# Patient Record
Sex: MALE | Race: WHITE | NOT HISPANIC OR LATINO | Employment: UNEMPLOYED | URBAN - METROPOLITAN AREA
[De-identification: names, ages, dates, MRNs, and addresses within clinical notes are randomized per-mention and may not be internally consistent; named-entity substitution may affect disease eponyms.]

---

## 2021-06-19 ENCOUNTER — OFFICE VISIT (OUTPATIENT)
Dept: URGENT CARE | Age: 36
End: 2021-06-19

## 2021-06-19 VITALS
HEART RATE: 92 BPM | WEIGHT: 180 LBS | TEMPERATURE: 98.6 F | HEIGHT: 72 IN | SYSTOLIC BLOOD PRESSURE: 118 MMHG | RESPIRATION RATE: 18 BRPM | DIASTOLIC BLOOD PRESSURE: 70 MMHG | BODY MASS INDEX: 24.38 KG/M2

## 2021-06-19 DIAGNOSIS — A69.20 LYME DISEASE: Primary | ICD-10-CM

## 2021-06-19 DIAGNOSIS — L03.115 CELLULITIS OF RIGHT LOWER EXTREMITY: ICD-10-CM

## 2021-06-19 DIAGNOSIS — L02.91 ABSCESS: ICD-10-CM

## 2021-06-19 PROCEDURE — 87070 CULTURE OTHR SPECIMN AEROBIC: CPT | Performed by: NURSE PRACTITIONER

## 2021-06-19 PROCEDURE — 10060 I&D ABSCESS SIMPLE/SINGLE: CPT | Performed by: NURSE PRACTITIONER

## 2021-06-19 PROCEDURE — 87205 SMEAR GRAM STAIN: CPT | Performed by: NURSE PRACTITIONER

## 2021-06-19 PROCEDURE — 99203 OFFICE O/P NEW LOW 30 MIN: CPT | Performed by: NURSE PRACTITIONER

## 2021-06-19 RX ORDER — AMOXICILLIN AND CLAVULANATE POTASSIUM 500; 125 MG/1; MG/1
1 TABLET, FILM COATED ORAL EVERY 8 HOURS SCHEDULED
Qty: 42 TABLET | Refills: 0 | Status: SHIPPED | OUTPATIENT
Start: 2021-06-19 | End: 2021-07-03

## 2021-06-19 NOTE — PROGRESS NOTES
[facility]        NAME: Fermin Amin is a 31 y o  male  : 1985    MRN: [id number]  DATE: 2021  TIME: 2:20 PM    Assessment and Plan   Lyme disease [A69 20]  1  Lyme disease  amoxicillin-clavulanate (Augmentin) 500-125 mg per tablet   2  Cellulitis of right lower extremity  Wound culture and Gram stain         Patient Instructions   Augmentin every 8 hours for 14 days  Keep area clean and dry   Tylenol/motrin for pain or fever  Follow up with your PCP   If fever worsens, redness increases, or you are unable to take the antibiotics proceed to the ER     Follow up with PCP in 3-5 days  Proceed to  ER if symptoms worsen  Chief Complaint     Chief Complaint   Patient presents with    Tick Removal     Patient noticed area on his right hip area that looks like a tick bite- been feeling progressively worse for the last 2 days with vomiting, chills and muscle aches         History of Present Illness       Patient is a 31year old male presenting with rash to the right hip for the past 4 days  It stated at a pimple and progressed to a large red, swollen, warm area  He is concerned about tick borne illness as [place] a [place]  However, denies having any known tick bites  Denies fever but has had chills and sweats  He had 2 episodes of vomiting yesterday  He is taking Advil and tolerating PO water intake  Review of Systems   Review of Systems   Constitutional: Positive for activity change, chills and diaphoresis  Negative for fever  Gastrointestinal: Positive for vomiting  Negative for abdominal pain  Skin: Positive for color change and rash  Neurological: Negative for headaches           Current Medications       Current Outpatient Medications:     amoxicillin-clavulanate (Augmentin) 500-125 mg per tablet, Take 1 tablet by mouth every 8 (eight) hours for 14 days, Disp: 42 tablet, Rfl: 0    Current Allergies     Allergies as of 2021    (No Known Allergies) The following portions of the patient's history were reviewed and updated as appropriate: allergies, current medications, past family history, past medical history, past social history, past surgical history and problem list      No past medical history on file  No past surgical history on file  No family history on file  Medications have been verified  Objective   /70 (BP Location: Right arm, Patient Position: Sitting, Cuff Size: Large)   Pulse 92   Temp 98 6 °F (37 °C) (Skin)   Resp 18   Ht 6' (1 829 m)   Wt 81 6 kg (180 lb)   BMI 24 41 kg/m²          Physical Exam     Physical Exam  Vitals reviewed  Constitutional:       General: He is awake  He is not in acute distress  Appearance: Normal appearance  He is normal weight  Cardiovascular:      Rate and Rhythm: Normal rate and regular rhythm  Heart sounds: Normal heart sounds, S1 normal and S2 normal    Pulmonary:      Effort: Pulmonary effort is normal       Breath sounds: Normal breath sounds  No decreased breath sounds, wheezing, rhonchi or rales  Skin:     General: Skin is warm and moist           Neurological:      General: No focal deficit present  Mental Status: He is alert, oriented to person, place, and time and easily aroused  Psychiatric:         Behavior: Behavior is cooperative  Incision and drain    Date/Time: 6/19/2021 8:30 AM  Performed by: ALVINA Cagle  Authorized by: ALVINA Cagle   Universal Protocol:  Procedure performed by:  Consent given by: patient  Patient understanding: patient states understanding of the procedure being performed  Patient identity confirmed: verbally with patient      Patient location:  Clinic  Location:     Type:  Abscess    Size:  10cm x 10 cm     Location:  Lower extremity    Lower extremity location:  R hip  Pre-procedure details:     Skin preparation:  Betadine  Anesthesia (see MAR for exact dosages):      Anesthesia method:  Local infiltration    Local anesthetic:  Lidocaine 1% WITH epi  Procedure details:     Complexity:  Simple    Needle aspiration: no      Incision types:  Single straight    Scalpel blade:  11    Approach:  Open    Incision depth:  Subcutaneous    Wound management:  Probed and deloculated and irrigated with saline    Drainage:  Bloody    Drainage amount:  Scant    Wound treatment:  Wound left open  Post-procedure details:     Patient tolerance of procedure:   Tolerated well, no immediate complications

## 2021-06-19 NOTE — PATIENT INSTRUCTIONS
Augmentin every 8 hours for 14 days  Keep area clean and dry   Tylenol/motrin for pain or fever  Follow up with your PCP   If fever worsens, redness increases, or you are unable to take the antibiotics proceed to the ER     Lyme Disease   WHAT YOU NEED TO KNOW:   Lyme disease is a bacterial infection caused by the bite of an infected tick  DISCHARGE INSTRUCTIONS:   Call your local emergency number (911 in the [address] [facility]) if:   · Your heart is beating faster than usual and you feel dizzy  · You have chest pain or trouble breathing  · You suddenly cannot talk or see well, or you have trouble moving an area of your body  Return to the emergency department if:   · You have a headache and a stiff neck  · You have trouble concentrating or thinking clearly  · You have numbness or tingling in your arms or legs, or you have trouble walking  Call your doctor if:   · Your rash grows or spreads to other areas of your body  · You suddenly have trouble falling or staying asleep  · You have new or worsening pain and swelling in your joints  · You have new or worsening weakness and muscle pain  · You have a new tick bite  · You have questions or concerns about your condition or care  Medicines:   · Antibiotics  treat a bacterial infection  · NSAIDs , such as ibuprofen, help decrease swelling, pain, and fever  This medicine is available with or without a doctor's order  NSAIDs can cause stomach bleeding or kidney problems in certain people  If you take blood thinner medicine, always ask your healthcare provider if NSAIDs are safe for you  Always read the medicine label and follow directions  · Take your medicine as directed  Contact your healthcare provider if you think your medicine is not helping or if you have side effects  Tell him of her if you are allergic to any medicine  Keep a list of the medicines, vitamins, and herbs you take  Include the amounts, and when and why you take them   Bring the list or the pill bottles to follow-up visits  Carry your medicine list with you in case of an emergency  Prevent a tick bite:  Ticks live in areas covered by brush and grass  They may even be found in your lawn if you live in certain areas  Outdoor pets can carry ticks inside the house  Ticks can grab onto you or your clothes when you walk by grass or brush  If you go into areas that contain many trees, tall grasses, and underbrush, do the following:  · Wear light colored pants and a long-sleeved shirt  Tuck your pants into your socks or boots  Tuck in your shirt  Wear sleeves that fit close to the skin at your wrists and neck  This will help prevent ticks from crawling through gaps in your clothing and onto your skin  Wear a hat in areas with trees  · Apply insect repellant on your skin  The insect repellant should contain DEET  Do not put insect repellant on skin that is cut, scratched, or irritated  Always use soap and water to wash the insect repellant off as soon as possible once you are indoors  Do not apply insect repellant on your child's face or hands  · Spray insect repellant onto your clothes  Use permethrin spray  This spray kills ticks that crawl on your clothing  Be sure to spray the tops of your boots, bottom of pant legs, and sleeve cuffs  As soon as possible, wash and dry clothing in hot water and high heat  · Check your and your child's clothing, hair, and skin for ticks  Shower within 2 hours of coming indoors  Carefully check the hairline, armpits, neck, and waist      · Decrease the risk for ticks in your yard  Ticks like to live in shady, moist areas  Shayna Jacks your lawn regularly to keep the grass short  Trim the grass around birdbaths and fences  Cut branches that are overgrown and take them out of the yard  Clear out leaf piles  [facility] in a dry, kvng area  · Treat pets with tick control products  as directed  This will decrease your risk for a tick bite   Check your pets for ticks  Remove ticks from pets the same way as you remove them from people  Ask your pet's  about the best product to use on your pet  · Remove a tick with tweezers  Wear gloves  Grasp the tick as close to your skin as possible  Pull the tick straight up and out  Do not touch the tick with your bare hands  Check to make sure you removed the whole tick, including the head  Clean the area with soap and water or rubbing alcohol  Then wash your hands with soap and water  Follow up with your doctor as directed:  Write down your questions so you remember to ask them during your visits  © Copyright [address] Information is for End User's use only and may not be sold, redistributed or otherwise used for commercial purposes  All illustrations and images included in CareNotes® are the copyrighted property of A D A M , Inc  or [facility] Issac Seo   The above information is an  only  It is not intended as medical advice for individual conditions or treatments  Talk to your doctor, nurse or pharmacist before following any medical regimen to see if it is safe and effective for you  Cellulitis   WHAT YOU NEED TO KNOW:   Cellulitis is a skin infection caused by bacteria  Cellulitis may go away on its own or you may need treatment  Your healthcare provider may draw a Knik around the outside edges of your cellulitis  If your cellulitis spreads, your healthcare provider will see it outside of the Knik  DISCHARGE INSTRUCTIONS:   Call 911 if:   · You have sudden trouble breathing or chest pain  Return to the emergency department if:   · Your wound gets larger and more painful  · You feel a crackling under your skin when you touch it  · You have purple dots or bumps on your skin, or you see bleeding under your skin  · You have new swelling and pain in your legs  · The red, warm, swollen area gets larger      · You see red streaks coming from the infected area     Contact your healthcare provider if:   · You have a fever  · Your fever or pain does not go away or gets worse  · The area does not get smaller after 2 days of antibiotics  · Your skin is flaking or peeling off  · You have questions or concerns about your condition or care  Medicines:   · Antibiotics  help treat the bacterial infection  · NSAIDs , such as ibuprofen, help decrease swelling, pain, and fever  NSAIDs can cause stomach bleeding or kidney problems in certain people  If you take blood thinner medicine, always ask if NSAIDs are safe for you  Always read the medicine label and follow directions  Do not give these medicines to children under 10months of age without direction from your child's healthcare provider  · Acetaminophen  decreases pain and fever  It is available without a doctor's order  Ask how much to take and how often to take it  Follow directions  Read the labels of all other medicines you are using to see if they also contain acetaminophen, or ask your doctor or pharmacist  Acetaminophen can cause liver damage if not taken correctly  Do not use more than 4 grams (4,000 milligrams) total of acetaminophen in one day  · Take your medicine as directed  Contact your healthcare provider if you think your medicine is not helping or if you have side effects  Tell him or her if you are allergic to any medicine  Keep a list of the medicines, vitamins, and herbs you take  Include the amounts, and when and why you take them  Bring the list or the pill bottles to follow-up visits  Carry your medicine list with you in case of an emergency  Self-care:   · Elevate the area above the level of your heart  as often as you can  This will help decrease swelling and pain  Prop the area on pillows or blankets to keep it elevated comfortably  · Clean the area daily until the wound scabs over  Gently wash the area with soap and water  Pat dry  Use dressings as directed  · Place cool or warm, wet cloths on the area as directed  Use clean cloths and clean water  Leave it on the area until the cloth is room temperature  Pat the area dry with a clean, dry cloth  The cloths may help decrease pain  Prevent cellulitis:   · Do not scratch bug bites or areas of injury  You increase your risk for cellulitis by scratching these areas  · Do not share personal items, such as towels, clothing, and razors  · Clean exercise equipment  with germ-killing  before and after you use it  · Wash your hands often  Use soap and water  Wash your hands after you use the bathroom, change a child's diapers, or sneeze  Wash your hands before you prepare or eat food  Use lotion to prevent dry, cracked skin  · Wear pressure stockings as directed  You may be told to wear the stockings if you have peripheral edema  The stockings improve blood flow and decrease swelling  · Treat athlete's foot  This can help prevent the spread of a bacterial skin infection  Follow up with your healthcare provider within 3 days, or as directed: Your healthcare provider will check if your cellulitis is getting better  You may need different medicine  Write down your questions so you remember to ask them during your visits  © Copyright [address] [place] [address] Drive Information is for End User's use only and may not be sold, redistributed or otherwise used for commercial purposes  All illustrations and images included in CareNotes® are the copyrighted property of A D A M , Inc  or Sarath Lewis  The above information is an  only  It is not intended as medical advice for individual conditions or treatments  Talk to your doctor, nurse or pharmacist before following any medical regimen to see if it is safe and effective for you

## 2021-06-23 ENCOUNTER — OFFICE VISIT (OUTPATIENT)
Dept: FAMILY MEDICINE CLINIC | Facility: CLINIC | Age: 36
End: 2021-06-23

## 2021-06-23 VITALS
HEIGHT: 72 IN | WEIGHT: 184.2 LBS | OXYGEN SATURATION: 98 % | DIASTOLIC BLOOD PRESSURE: 78 MMHG | RESPIRATION RATE: 16 BRPM | HEART RATE: 67 BPM | BODY MASS INDEX: 24.95 KG/M2 | SYSTOLIC BLOOD PRESSURE: 142 MMHG | TEMPERATURE: 97.3 F

## 2021-06-23 DIAGNOSIS — L08.9 SKIN INFECTION: Primary | ICD-10-CM

## 2021-06-23 LAB
BACTERIA WND AEROBE CULT: NORMAL
GRAM STN SPEC: NORMAL

## 2021-06-23 PROCEDURE — 99213 OFFICE O/P EST LOW 20 MIN: CPT | Performed by: NURSE PRACTITIONER

## 2021-06-29 NOTE — PROGRESS NOTES
BMI Counseling: Body mass index is 24 98 kg/m²  The BMI is above normal  Nutrition recommendations include decreasing portion sizes, encouraging healthy choices of fruits and vegetables, decreasing fast food intake, consuming healthier snacks, limiting drinks that contain sugar, moderation in carbohydrate intake, increasing intake of lean protein and reducing intake of cholesterol  Exercise recommendations include exercising 3-5 times per week and strength training exercises  Normal BMI       Depression Screening and Follow-up Plan: Patient's depression screening was positive with a PHQ-2 score of 0  Clincally patient does not have depression  No treatment is required  Tobacco Cessation Counseling: Tobacco cessation counseling was provided  The patient is sincerely urged to quit consumption of tobacco  He is not ready to quit tobacco  Medication options and side effects of medication discussed  Patient agreed to medication  Assessment/Plan:  Presents in office post urgent care follow up for skin infection to the right hip that required to be drained  Currently on antibiotics- healing well no redness or swelling - still slight yellowish drainage  Area cleaned with water - dressing changed  '  Continue antibiotics and discussed follow up if needed   Reportable s/s    I have requested some labs to be done when done with antibiotics  Monitor for fevers - if any fevers or worsening of symptoms despite antibiotic use he is to call us      Problem List Items Addressed This Visit     None      Visit Diagnoses     Skin infection    -  Primary    Relevant Orders    CBC and differential    Comprehensive metabolic panel            Subjective:      Patient ID: Isabel Maya is a 31 y o  male  resents in office post urgent care follow up for skin infection to the right hip that required to be drained  Currently on antibiotics- healing well no redness or swelling - still slight yellowish drainage   Area cleaned with water - dressing changed  '  Continue antibiotics and discussed follow up if needed         The following portions of the patient's history were reviewed and updated as appropriate:   Past Medical History:  He has no past medical history on file ,  _______________________________________________________________________  Medical Problems:  does not have a problem list on file ,  _______________________________________________________________________  Past Surgical History:   has no past surgical history on file ,  _______________________________________________________________________  Family History:  family history is not on file ,  _______________________________________________________________________  Social History:   reports that he has been smoking  He has never used smokeless tobacco  He reports current alcohol use  He reports that he does not use drugs  ,  _______________________________________________________________________  Allergies:  has No Known Allergies     _______________________________________________________________________  Current Outpatient Medications   Medication Sig Dispense Refill    amoxicillin-clavulanate (Augmentin) 500-125 mg per tablet Take 1 tablet by mouth every 8 (eight) hours for 14 days 42 tablet 0     No current facility-administered medications for this visit      _______________________________________________________________________  Review of Systems   Constitutional: Negative for chills and fatigue  HENT: Negative for congestion and sore throat  Eyes: Negative  Respiratory: Negative for cough  Cardiovascular: Negative for chest pain and palpitations  Gastrointestinal: Negative for abdominal pain  Endocrine: Negative  Genitourinary: Negative for flank pain  Skin:        Right hip skin infection    Hematological: Negative for adenopathy  Psychiatric/Behavioral: Negative for suicidal ideas  The patient is not nervous/anxious            Objective:  Vitals: 06/23/21 1447   BP: 142/78   BP Location: Left arm   Patient Position: Sitting   Cuff Size: Standard   Pulse: 67   Resp: 16   Temp: (!) 97 3 °F (36 3 °C)   TempSrc: Temporal   SpO2: 98%   Weight: 83 6 kg (184 lb 3 2 oz)   Height: 6' (1 829 m)     Body mass index is 24 98 kg/m²  Physical Exam  Vitals and nursing note reviewed  Constitutional:       Appearance: Normal appearance  Comments: BMI 24 98    HENT:      Head: Normocephalic  Nose: Nose normal       Mouth/Throat:      Mouth: Mucous membranes are dry  Cardiovascular:      Rate and Rhythm: Normal rate and regular rhythm  Pulses: Normal pulses  Heart sounds: Normal heart sounds  Pulmonary:      Effort: Pulmonary effort is normal       Breath sounds: Normal breath sounds  Abdominal:      Palpations: Abdomen is soft  Musculoskeletal:         General: Normal range of motion  Cervical back: Normal range of motion  Skin:     Findings: Erythema present  Comments: Right hip skin infection  Round 1cm in diameter  With open mid area - small drainage of yellowish clear drainage    Neurological:      Mental Status: He is alert and oriented to person, place, and time     Psychiatric:         Mood and Affect: Mood normal          Behavior: Behavior normal

## 2021-06-29 NOTE — PATIENT INSTRUCTIONS
Folliculitis   WHAT YOU NEED TO KNOW:   Folliculitis is inflammation of your hair follicles  A hair follicle is a sac under your skin  Your hair grows out of the follicle  Folliculitis is caused by bacteria or fungus, most commonly a germ called Staph  Folliculitis can occur anywhere you have hair  DISCHARGE INSTRUCTIONS:   Medicines:   · Antibiotics: This medicine is given to fight or prevent an infection caused by bacteria  It may be given as an ointment that you apply to your skin or as a pill  Always take your antibiotics exactly as ordered by your healthcare provider  Never save antibiotics or take leftover antibiotics that were given to you for another illness  · Antifungal medicine: This medicine helps kill fungus that may be causing your folliculitis  It may be given as an cream that you apply to your skin or as a pill  · NSAIDs , such as ibuprofen, help decrease swelling, pain, and fever  This medicine is available with or without a doctor's order  NSAIDs can cause stomach bleeding or kidney problems in certain people  If you take blood thinner medicine, always ask if NSAIDs are safe for you  Always read the medicine label and follow directions  Do not give these medicines to children under 10months of age without direction from your child's healthcare provider  · Antihistamines: This medicine may be given to help decrease itching  · Take your medicine as directed  Contact your healthcare provider if you think your medicine is not helping or if you have side effects  Tell him of her if you are allergic to any medicine  Keep a list of the medicines, vitamins, and herbs you take  Include the amounts, and when and why you take them  Bring the list or the pill bottles to follow-up visits  Carry your medicine list with you in case of an emergency      Follow up with your healthcare provider or dermatologist as directed:  Write down your questions so you remember to ask them during your visits  Manage folliculitis:   · Use warm compresses:  Wet a washcloth with warm water and apply it to the infected skin area to help decrease pain and swelling  Warm compresses may also help drain pus and improve healing  · Clean the area:  Use antibacterial soap to wash the affected area  Change your washcloths and towels every day  · Avoid shaving the area: If possible, do not shave areas that have folliculitis  If you must shave, use an electric razor or new blade every time you shave  Prevent folliculitis:   · Do not share personal items:  Do not share towels, soap, or any personal items with other people  · Do not wear tight clothing:  Do not wear tight-fitting clothes that rub against and irritate your skin  · Treat skin injuries right away:  Treat injuries such as cuts and scrapes right away  Wash them with warm, soapy water, and cover the area to prevent infection  Contact your healthcare provider or dermatologist if:  · You have a fever  · You have foul-smelling pus coming from the bumps on your skin  · Your rash is spreading  · You have questions or concerns about your condition or care  Return to the emergency department if:  · You develop large areas of red, warm, tender skin around the folliculitis  · You develop boils  © Copyright [address] Information is for End User's use only and may not be sold, redistributed or otherwise used for commercial purposes  All illustrations and images included in CareNotes® are the copyrighted property of A D A M , Inc  or [facility] [employer]   The above information is an  only  It is not intended as medical advice for individual conditions or treatments  Talk to your doctor, nurse or pharmacist before following any medical regimen to see if it is safe and effective for you  Cellulitis   WHAT YOU NEED TO KNOW:   Cellulitis is a skin infection caused by bacteria   Cellulitis may go away on its own or you may need treatment  Your healthcare provider may draw a [place] around the outside edges of your cellulitis  If your cellulitis spreads, your healthcare provider will see it outside of the [place]  DISCHARGE INSTRUCTIONS:   Call 911 if:   · You have sudden trouble breathing or chest pain  Return to the emergency department if:   · Your wound gets larger and more painful  · You feel a crackling under your skin when you touch it  · You have purple dots or bumps on your skin, or you see bleeding under your skin  · You have new swelling and pain in your legs  · The red, warm, swollen area gets larger  · You see red streaks coming from the infected area  Contact your healthcare provider if:   · You have a fever  · Your fever or pain does not go away or gets worse  · The area does not get smaller after 2 days of antibiotics  · Your skin is flaking or peeling off  · You have questions or concerns about your condition or care  Medicines:   · Antibiotics  help treat the bacterial infection  · NSAIDs , such as ibuprofen, help decrease swelling, pain, and fever  NSAIDs can cause stomach bleeding or kidney problems in certain people  If you take blood thinner medicine, always ask if NSAIDs are safe for you  Always read the medicine label and follow directions  Do not give these medicines to children under 10months of age without direction from your child's healthcare provider  · Acetaminophen  decreases pain and fever  It is available without a doctor's order  Ask how much to take and how often to take it  Follow directions  Read the labels of all other medicines you are using to see if they also contain acetaminophen, or ask your doctor or pharmacist  Acetaminophen can cause liver damage if not taken correctly  Do not use more than 4 grams (4,000 milligrams) total of acetaminophen in one day  · Take your medicine as directed    Contact your healthcare provider if you think your medicine is not helping or if you have side effects  Tell him or her if you are allergic to any medicine  Keep a list of the medicines, vitamins, and herbs you take  Include the amounts, and when and why you take them  Bring the list or the pill bottles to follow-up visits  Carry your medicine list with you in case of an emergency  Self-care:   · Elevate the area above the level of your heart  as often as you can  This will help decrease swelling and pain  Prop the area on pillows or blankets to keep it elevated comfortably  · Clean the area daily until the wound scabs over  Gently wash the area with soap and water  Pat dry  Use dressings as directed  · Place cool or warm, wet cloths on the area as directed  Use clean cloths and clean water  Leave it on the area until the cloth is room temperature  Pat the area dry with a clean, dry cloth  The cloths may help decrease pain  Prevent cellulitis:   · Do not scratch bug bites or areas of injury  You increase your risk for cellulitis by scratching these areas  · Do not share personal items, such as towels, clothing, and razors  · Clean exercise equipment  with germ-killing  before and after you use it  · Wash your hands often  Use soap and water  Wash your hands after you use the bathroom, change a child's diapers, or sneeze  Wash your hands before you prepare or eat food  Use lotion to prevent dry, cracked skin  · Wear pressure stockings as directed  You may be told to wear the stockings if you have peripheral edema  The stockings improve blood flow and decrease swelling  · Treat athlete's foot  This can help prevent the spread of a bacterial skin infection  Follow up with your healthcare provider within 3 days, or as directed: Your healthcare provider will check if your cellulitis is getting better  You may need different medicine  Write down your questions so you remember to ask them during your visits    © Copyright [address] Information is for [employer] use only and may not be sold, redistributed or otherwise used for commercial purposes  All illustrations and images included in CareNotes® are the copyrighted property of A D A M , Inc  or Sarath Lewis  The above information is an  only  It is not intended as medical advice for individual conditions or treatments  Talk to your doctor, nurse or pharmacist before following any medical regimen to see if it is safe and effective for you

## 2023-03-24 ENCOUNTER — APPOINTMENT (EMERGENCY)
Dept: RADIOLOGY | Facility: HOSPITAL | Age: 38
End: 2023-03-24

## 2023-03-24 ENCOUNTER — HOSPITAL ENCOUNTER (INPATIENT)
Facility: HOSPITAL | Age: 38
LOS: 6 days | Discharge: HOME | End: 2023-03-31
Attending: EMERGENCY MEDICINE | Admitting: INTERNAL MEDICINE

## 2023-03-24 DIAGNOSIS — M71.121 SEPTIC BURSITIS OF ELBOW, RIGHT: Primary | ICD-10-CM

## 2023-03-24 DIAGNOSIS — D72.829 LEUKOCYTOSIS: ICD-10-CM

## 2023-03-24 DIAGNOSIS — M70.31 BURSITIS OF RIGHT ELBOW, UNSPECIFIED BURSA: ICD-10-CM

## 2023-03-24 RX ORDER — ONDANSETRON 2 MG/ML
4 INJECTION INTRAMUSCULAR; INTRAVENOUS ONCE
Status: COMPLETED | OUTPATIENT
Start: 2023-03-25 | End: 2023-03-25

## 2023-03-24 RX ORDER — ACETAMINOPHEN 325 MG/1
975 TABLET ORAL ONCE
Status: COMPLETED | OUTPATIENT
Start: 2023-03-24 | End: 2023-03-24

## 2023-03-24 RX ORDER — LIDOCAINE HYDROCHLORIDE 10 MG/ML
5 INJECTION, SOLUTION EPIDURAL; INFILTRATION; INTRACAUDAL; PERINEURAL ONCE
Status: COMPLETED | OUTPATIENT
Start: 2023-03-24 | End: 2023-03-24

## 2023-03-24 RX ADMIN — ACETAMINOPHEN 975 MG: 325 TABLET ORAL at 23:22

## 2023-03-24 RX ADMIN — LIDOCAINE HYDROCHLORIDE 5 ML: 10 INJECTION, SOLUTION EPIDURAL; INFILTRATION; INTRACAUDAL; PERINEURAL at 23:21

## 2023-03-24 NOTE — LETTER
[facility]G UN[facility]  [address] [facility] [address]  Dept: [phone]    March 31, 2023     Patient: Chelle Olivia   YOB: 1985   Date of Visit: 3/24/2023       To Whom it May Concern:    Parris Seip is under my professional care  He was seen in the hospital from 3/24/2023 to 03/31/23  He He has to follow up with his Primary care physician who will clear him to return to work  If you have any questions or concerns, please don't hesitate to call           Sincerely,          Cassidy Foster MD

## 2023-03-25 PROBLEM — A41.9 SEPSIS (HCC): Status: ACTIVE | Noted: 2023-03-25

## 2023-03-25 PROBLEM — M71.9 BURSITIS: Status: ACTIVE | Noted: 2023-03-25

## 2023-03-25 PROBLEM — F17.200 NICOTINE DEPENDENCE: Status: ACTIVE | Noted: 2023-03-25

## 2023-03-25 LAB
ANION GAP SERPL CALCULATED.3IONS-SCNC: 11 MMOL/L (ref 4–13)
ANION GAP SERPL CALCULATED.3IONS-SCNC: 8 MMOL/L (ref 4–13)
ANISOCYTOSIS BLD QL SMEAR: PRESENT
APPEARANCE FLD: ABNORMAL
BASOPHILS # BLD AUTO: 0.11 THOUSANDS/ÂΜL (ref 0–0.1)
BASOPHILS # BLD MANUAL: 0 THOUSAND/UL (ref 0–0.1)
BASOPHILS NFR BLD AUTO: 1 % (ref 0–1)
BASOPHILS NFR MAR MANUAL: 0 % (ref 0–1)
BASOPHILS NFR SNV MANUAL: 1 %
BUN SERPL-MCNC: 11 MG/DL (ref 5–25)
BUN SERPL-MCNC: 14 MG/DL (ref 5–25)
CALCIUM SERPL-MCNC: 10.1 MG/DL (ref 8.4–10.2)
CALCIUM SERPL-MCNC: 9.1 MG/DL (ref 8.4–10.2)
CHLORIDE SERPL-SCNC: 106 MMOL/L (ref 96–108)
CHLORIDE SERPL-SCNC: 106 MMOL/L (ref 96–108)
CO2 SERPL-SCNC: 23 MMOL/L (ref 21–32)
CO2 SERPL-SCNC: 23 MMOL/L (ref 21–32)
COLOR FLD: ABNORMAL
CREAT SERPL-MCNC: 0.86 MG/DL (ref 0.6–1.3)
CREAT SERPL-MCNC: 0.88 MG/DL (ref 0.6–1.3)
CRP SERPL HS-MCNC: 3.72 MG/L
CRYSTALS SNV QL MICRO: NORMAL
EOSINOPHIL # BLD AUTO: 0.01 THOUSAND/ÂΜL (ref 0–0.61)
EOSINOPHIL # BLD MANUAL: 0.31 THOUSAND/UL (ref 0–0.4)
EOSINOPHIL NFR BLD AUTO: 0 % (ref 0–6)
EOSINOPHIL NFR BLD MANUAL: 2 % (ref 0–6)
EOSINOPHIL NFR SNV MANUAL: 2 %
ERYTHROCYTE [DISTWIDTH] IN BLOOD BY AUTOMATED COUNT: 12.8 % (ref 11.6–15.1)
ERYTHROCYTE [DISTWIDTH] IN BLOOD BY AUTOMATED COUNT: 12.9 % (ref 11.6–15.1)
ERYTHROCYTE [SEDIMENTATION RATE] IN BLOOD: 24 MM/HOUR (ref 0–14)
GFR SERPL CREATININE-BSD FRML MDRD: 110 ML/MIN/1.73SQ M
GFR SERPL CREATININE-BSD FRML MDRD: 111 ML/MIN/1.73SQ M
GLUCOSE SERPL-MCNC: 105 MG/DL (ref 65–140)
GLUCOSE SERPL-MCNC: 108 MG/DL (ref 65–140)
HCT VFR BLD AUTO: 45.2 % (ref 36.5–49.3)
HCT VFR BLD AUTO: 47.6 % (ref 36.5–49.3)
HGB BLD-MCNC: 15.4 G/DL (ref 12–17)
HGB BLD-MCNC: 16.2 G/DL (ref 12–17)
IMM GRANULOCYTES # BLD AUTO: 0.39 THOUSAND/UL (ref 0–0.2)
IMM GRANULOCYTES NFR BLD AUTO: 2 % (ref 0–2)
LACTATE SERPL-SCNC: 1.7 MMOL/L (ref 0.5–2)
LYMPHOCYTES # BLD AUTO: 0.49 THOUSANDS/ÂΜL (ref 0.6–4.47)
LYMPHOCYTES # BLD AUTO: 0.79 THOUSAND/UL (ref 0.6–4.47)
LYMPHOCYTES # BLD AUTO: 5 % (ref 14–44)
LYMPHOCYTES NFR BLD AUTO: 2 % (ref 14–44)
MCH RBC QN AUTO: 29.6 PG (ref 26.8–34.3)
MCH RBC QN AUTO: 30.1 PG (ref 26.8–34.3)
MCHC RBC AUTO-ENTMCNC: 34 G/DL (ref 31.4–37.4)
MCHC RBC AUTO-ENTMCNC: 34.1 G/DL (ref 31.4–37.4)
MCV RBC AUTO: 87 FL (ref 82–98)
MCV RBC AUTO: 88 FL (ref 82–98)
MONOCYTES # BLD AUTO: 0.63 THOUSAND/UL (ref 0–1.22)
MONOCYTES # BLD AUTO: 1.3 THOUSAND/ÂΜL (ref 0.17–1.22)
MONOCYTES NFR BLD AUTO: 6 % (ref 4–12)
MONOCYTES NFR BLD: 4 % (ref 4–12)
NEUTROPHILS # BLD AUTO: 20.02 THOUSANDS/ÂΜL (ref 1.85–7.62)
NEUTROPHILS # BLD MANUAL: 13.99 THOUSAND/UL (ref 1.85–7.62)
NEUTROPHILS NFR SNV MANUAL: 97 %
NEUTS BAND NFR BLD MANUAL: 1 % (ref 0–8)
NEUTS SEG NFR BLD AUTO: 88 % (ref 43–75)
NEUTS SEG NFR BLD AUTO: 89 % (ref 43–75)
NRBC BLD AUTO-RTO: 0 /100 WBCS
PLATELET # BLD AUTO: 274 THOUSANDS/UL (ref 149–390)
PLATELET # BLD AUTO: 327 THOUSANDS/UL (ref 149–390)
PLATELET BLD QL SMEAR: ADEQUATE
PMV BLD AUTO: 9.1 FL (ref 8.9–12.7)
PMV BLD AUTO: 9.9 FL (ref 8.9–12.7)
POIKILOCYTOSIS BLD QL SMEAR: PRESENT
POTASSIUM SERPL-SCNC: 3.8 MMOL/L (ref 3.5–5.3)
POTASSIUM SERPL-SCNC: 4.2 MMOL/L (ref 3.5–5.3)
RBC # BLD AUTO: 5.12 MILLION/UL (ref 3.88–5.62)
RBC # BLD AUTO: 5.47 MILLION/UL (ref 3.88–5.62)
SITE: ABNORMAL
SODIUM SERPL-SCNC: 137 MMOL/L (ref 135–147)
SODIUM SERPL-SCNC: 140 MMOL/L (ref 135–147)
TOTAL CELLS COUNTED SPEC: 100
WBC # BLD AUTO: 15.72 THOUSAND/UL (ref 4.31–10.16)
WBC # BLD AUTO: 22.32 THOUSAND/UL (ref 4.31–10.16)
WBC # FLD MANUAL: ABNORMAL /UL (ref 0–200)

## 2023-03-25 RX ORDER — KETOROLAC TROMETHAMINE 30 MG/ML
15 INJECTION, SOLUTION INTRAMUSCULAR; INTRAVENOUS EVERY 6 HOURS PRN
Status: DISPENSED | OUTPATIENT
Start: 2023-03-25 | End: 2023-03-27

## 2023-03-25 RX ORDER — NICOTINE 21 MG/24HR
1 PATCH, TRANSDERMAL 24 HOURS TRANSDERMAL DAILY
Status: DISCONTINUED | OUTPATIENT
Start: 2023-03-25 | End: 2023-03-31 | Stop reason: HOSPADM

## 2023-03-25 RX ORDER — KETOROLAC TROMETHAMINE 30 MG/ML
15 INJECTION, SOLUTION INTRAMUSCULAR; INTRAVENOUS ONCE
Status: COMPLETED | OUTPATIENT
Start: 2023-03-25 | End: 2023-03-25

## 2023-03-25 RX ORDER — CEFAZOLIN SODIUM 2 G/50ML
2000 SOLUTION INTRAVENOUS EVERY 8 HOURS SCHEDULED
Status: DISCONTINUED | OUTPATIENT
Start: 2023-03-25 | End: 2023-03-26

## 2023-03-25 RX ORDER — ONDANSETRON 2 MG/ML
4 INJECTION INTRAMUSCULAR; INTRAVENOUS EVERY 6 HOURS PRN
Status: DISCONTINUED | OUTPATIENT
Start: 2023-03-25 | End: 2023-03-31 | Stop reason: HOSPADM

## 2023-03-25 RX ORDER — ACETAMINOPHEN 325 MG/1
650 TABLET ORAL EVERY 6 HOURS PRN
Status: DISCONTINUED | OUTPATIENT
Start: 2023-03-25 | End: 2023-03-28

## 2023-03-25 RX ORDER — SODIUM CHLORIDE, SODIUM LACTATE, POTASSIUM CHLORIDE, CALCIUM CHLORIDE 600; 310; 30; 20 MG/100ML; MG/100ML; MG/100ML; MG/100ML
100 INJECTION, SOLUTION INTRAVENOUS CONTINUOUS
Status: DISPENSED | OUTPATIENT
Start: 2023-03-25 | End: 2023-03-25

## 2023-03-25 RX ORDER — CEFAZOLIN SODIUM 1 G/50ML
1000 SOLUTION INTRAVENOUS EVERY 8 HOURS
Status: DISCONTINUED | OUTPATIENT
Start: 2023-03-26 | End: 2023-03-25

## 2023-03-25 RX ADMIN — KETOROLAC TROMETHAMINE 15 MG: 30 INJECTION, SOLUTION INTRAMUSCULAR at 21:19

## 2023-03-25 RX ADMIN — SODIUM CHLORIDE, SODIUM LACTATE, POTASSIUM CHLORIDE, AND CALCIUM CHLORIDE 100 ML/HR: .6; .31; .03; .02 INJECTION, SOLUTION INTRAVENOUS at 04:16

## 2023-03-25 RX ADMIN — SODIUM CHLORIDE, SODIUM LACTATE, POTASSIUM CHLORIDE, AND CALCIUM CHLORIDE 100 ML/HR: .6; .31; .03; .02 INJECTION, SOLUTION INTRAVENOUS at 08:27

## 2023-03-25 RX ADMIN — KETOROLAC TROMETHAMINE 15 MG: 30 INJECTION, SOLUTION INTRAMUSCULAR; INTRAVENOUS at 02:30

## 2023-03-25 RX ADMIN — SODIUM CHLORIDE, SODIUM LACTATE, POTASSIUM CHLORIDE, AND CALCIUM CHLORIDE 1000 ML: .6; .31; .03; .02 INJECTION, SOLUTION INTRAVENOUS at 00:27

## 2023-03-25 RX ADMIN — ACETAMINOPHEN 650 MG: 325 TABLET ORAL at 04:22

## 2023-03-25 RX ADMIN — CEFAZOLIN SODIUM 2000 MG: 2 SOLUTION INTRAVENOUS at 21:19

## 2023-03-25 RX ADMIN — VANCOMYCIN HYDROCHLORIDE 2000 MG: 1 INJECTION, POWDER, LYOPHILIZED, FOR SOLUTION INTRAVENOUS at 06:07

## 2023-03-25 RX ADMIN — ONDANSETRON 4 MG: 2 INJECTION INTRAMUSCULAR; INTRAVENOUS at 17:37

## 2023-03-25 RX ADMIN — CEFTRIAXONE SODIUM 2000 MG: 10 INJECTION, POWDER, FOR SOLUTION INTRAVENOUS at 02:33

## 2023-03-25 RX ADMIN — ACETAMINOPHEN 650 MG: 325 TABLET ORAL at 09:15

## 2023-03-25 RX ADMIN — ONDANSETRON 4 MG: 2 INJECTION INTRAMUSCULAR; INTRAVENOUS at 00:21

## 2023-03-25 RX ADMIN — ONDANSETRON 4 MG: 2 INJECTION INTRAMUSCULAR; INTRAVENOUS at 12:44

## 2023-03-25 RX ADMIN — CEFAZOLIN SODIUM 2000 MG: 2 SOLUTION INTRAVENOUS at 13:28

## 2023-03-25 NOTE — H&P
[facility]  Name: Candace Craig  MRN: [id number]  Unit/Bed#: S -50 I Date of Admission: 3/24/2023   Date of Service: 3/25/2023 I Hospital Day: 0      Assessment/Plan   * Bursitis  Assessment & Plan  · Presented with sudden right elbow erythema, pain, swelling, rigors  Meet sepsis  · Synovial fluid: Cloudy, WBC 21,000  Culture, Gram stain pending  · Sed rate: 24, CRP pending  · Antibiotics: Rocephin  Continue with ancef and also add MRSA while awaiting culture  Consult pharmacy  · Pain control  · Consider ortho consult for ongoing needle aspiration pending response to antibiotic     Sepsis ([facility] [address] )  Assessment & Plan  · POA with leukocytosis, tachycardia  Lactate normal   · Source: Bursitis right elbow  · Cultures  · Continue Rocephin x1  Continue with ancef, vanco     Nicotine dependence  Assessment & Plan  · NRT while hospitalized       VTE Pharmacologic Prophylaxis: VTE Score: 2 Low Risk (Score 0-2) - Encourage Ambulation  Code Status: Level 1 - Full Code   Discussion with family: Patient declined call to   Anticipated Length of Stay: Patient will be admitted on an inpatient basis with an anticipated length of stay of greater than 2 midnights secondary to IV abx  Total Time Spent on Date of Encounter in care of patient: 75 minutes This time was spent on one or more of the following: performing physical exam; counseling and coordination of care; obtaining or reviewing history; documenting in the medical record; reviewing/ordering tests, medications or procedures; communicating with other healthcare professionals and discussing with patient's family/caregivers  Chief Complaint: right elbow pain, swelling    History of Present Illness:  Damián Longo is a 41 y o  male with a PMH of nicotine dependence, alcohol consumption, marijuana use who presents with sudden onset of right elbow pain, swelling, erythema    He previously had a scab but without signs of surrounding cellulitis  Scab occurred while playing with friend's child  Patient meets sepsis  Suspect septic bursitis  Fluid samples collected in ED consistent with infection  He was given rocephin in ED  Will continue with Ancef and add vancomycin pending cultures  Patient is employed as a   Review of Systems:  Review of Systems   Constitutional: Positive for chills  Musculoskeletal: Positive for arthralgias and joint swelling  Skin: Positive for color change  All other systems reviewed and are negative  Past Medical and Surgical History:   History reviewed  No pertinent past medical history  History reviewed  No pertinent surgical history  Meds/Allergies:  Prior to Admission medications    Not on File     I have reviewed home medications with a medical source (PCP, Pharmacy, other)  Allergies: No Known Allergies    Social History:  Marital Status: Single   Occupation:   Patient Pre-hospital Living Situation: Home  Patient Pre-hospital Level of Mobility: walks  Patient Pre-hospital Diet Restrictions:   Substance Use History:   Social History     Substance and Sexual Activity   Alcohol Use Yes   • Alcohol/week: 6 0 standard drinks   • Types: 6 Cans of beer per week     Social History     Tobacco Use   Smoking Status Every Day   • Packs/day: 1 00   • Types: Cigarettes   Smokeless Tobacco Never     Social History     Substance and Sexual Activity   Drug Use Yes   • Types: Marijuana       Family History:  History reviewed  No pertinent family history      Physical Exam:     Vitals:   Blood Pressure: 128/72 (03/25/23 0343)  Pulse: 98 (03/25/23 0343)  Temperature: (!) 102 7 °F (39 3 °C) (RN notified) (03/25/23 0343)  Temp Source: Oral (03/25/23 0343)  Respirations: 20 (03/25/23 0245)  Height: 6' (182 9 cm) (03/25/23 0343)  Weight - Scale: 82 9 kg (182 lb 11 2 oz) (03/25/23 0343)  SpO2: 97 % (03/25/23 0343)    Physical Exam  Constitutional:       General: He is not in acute distress  Appearance: Normal appearance  He is not ill-appearing  HENT:      Head: Normocephalic and atraumatic  Right Ear: External ear normal       Left Ear: External ear normal       Nose: Nose normal       Mouth/Throat:      Mouth: Mucous membranes are moist       Pharynx: Oropharynx is clear  Eyes:      General: No scleral icterus  Extraocular Movements: Extraocular movements intact  Conjunctiva/sclera: Conjunctivae normal    Cardiovascular:      Rate and Rhythm: Regular rhythm  Tachycardia present  Pulses: Normal pulses  Heart sounds: Normal heart sounds  Pulmonary:      Effort: Pulmonary effort is normal       Breath sounds: Normal breath sounds  Abdominal:      General: Bowel sounds are normal       Palpations: Abdomen is soft  Musculoskeletal:         General: Swelling present  Normal range of motion  Right elbow: Tenderness present  Cervical back: Normal range of motion  No rigidity or tenderness  Skin:     General: Skin is warm and dry  Capillary Refill: Capillary refill takes less than 2 seconds  Findings: Erythema (R elbow) present  Neurological:      General: No focal deficit present  Mental Status: He is alert and oriented to person, place, and time     Psychiatric:         Mood and Affect: Mood normal          Behavior: Behavior normal           Additional Data:     Lab Results:  Results from last 7 days   Lab Units 03/25/23  0006   WBC Thousand/uL 15 72*   HEMOGLOBIN g/dL 16 2   HEMATOCRIT % 47 6   PLATELETS Thousands/uL 327   BANDS PCT % 1   LYMPHO PCT % 5*   MONO PCT % 4   EOS PCT % 2     Results from last 7 days   Lab Units 03/25/23  0006   SODIUM mmol/L 140   POTASSIUM mmol/L 4 2   CHLORIDE mmol/L 106   CO2 mmol/L 23   BUN mg/dL 14   CREATININE mg/dL 0 86   ANION GAP mmol/L 11   CALCIUM mg/dL 10 1   GLUCOSE RANDOM mg/dL 105                 Results from last 7 days   Lab Units 03/25/23  0006   LACTIC ACID mmol/L 1 7 Lines/Drains:  Invasive Devices     Peripheral Intravenous Line  Duration           Peripheral IV 03/25/23 Left Antecubital <1 day                    Imaging: Reviewed radiology reports from this admission including: xray  XR elbow 2 vw RIGHT   ED Interpretation by Deven Herrera DO (03/25 0158)   Soft tissue swelling  No acute bony abnormality visualized  EKG and Other Studies Reviewed on Admission:   · EKG: No EKG obtained  ** Please Note: This note has been constructed using a voice recognition system   **

## 2023-03-25 NOTE — PLAN OF CARE
Problem: PAIN - ADULT  Goal: Verbalizes/displays adequate comfort level or baseline comfort level  Description: Interventions:  - Encourage patient to monitor pain and request assistance  - Assess pain using appropriate pain scale  - Administer analgesics based on type and severity of pain and evaluate response  - Implement non-pharmacological measures as appropriate and evaluate response  - Consider cultural and social influences on pain and pain management  - Notify physician/advanced practitioner if interventions unsuccessful or patient reports new pain  Outcome: Progressing     Problem: INFECTION - ADULT  Goal: Absence or prevention of progression during hospitalization  Description: INTERVENTIONS:  - Assess and monitor for signs and symptoms of infection  - Monitor lab/diagnostic results  - Monitor all insertion sites, i e  indwelling lines, tubes, and drains  - Monitor endotracheal if appropriate and nasal secretions for changes in amount and color  - [place] appropriate cooling/warming therapies per order  - Administer medications as ordered  - Instruct and encourage patient and family to use good hand hygiene technique  - Identify and instruct in appropriate isolation precautions for identified infection/condition  Outcome: Progressing     Problem: SAFETY ADULT  Goal: Patient will remain free of falls  Description: INTERVENTIONS:  - Educate patient/family on patient safety including physical limitations  - Instruct patient to call for assistance with activity   - Consult OT/PT to assist with strengthening/mobility   - Keep Call bell within reach  - Keep bed low and locked with side rails adjusted as appropriate  - Keep care items and personal belongings within reach  - Initiate and maintain comfort rounds  - Make Fall Risk Sign visible to staff  - Offer Toileting every 2 Hours, in advance of need  - Initiate/Maintain alarm  - Obtain necessary fall risk management equipment:   - Apply yellow socks and bracelet for high fall risk patients  - Consider moving patient to room near nurses station  Outcome: Progressing  Goal: Maintain or return to baseline ADL function  Description: INTERVENTIONS:  -  Assess patient's ability to carry out ADLs; assess patient's baseline for ADL function and identify physical deficits which impact ability to perform ADLs (bathing, care of mouth/teeth, toileting, grooming, dressing, etc )  - Assess/evaluate cause of self-care deficits   - Assess range of motion  - Assess patient's mobility; develop plan if impaired  - Assess patient's need for assistive devices and provide as appropriate  - Encourage maximum independence but intervene and supervise when necessary  - Involve family in performance of ADLs  - Assess for home care needs following discharge   - Consider OT consult to assist with ADL evaluation and planning for discharge  - Provide patient education as appropriate  Outcome: Progressing  Goal: Maintains/Returns to pre admission functional level  Description: INTERVENTIONS:  - Perform BMAT or MOVE assessment daily    - Set and communicate daily mobility goal to care team and patient/family/caregiver  - Collaborate with rehabilitation services on mobility goals if consulted  - Perform Range of Motion 2 times a day  - Reposition patient every 2 hours    - Dangle patient 2 times a day  - Stand patient 2 times a day  - Ambulate patient 2 times a day  - Out of bed to chair 2 times a day   - Out of bed for meals 2 times a day  - Out of bed for toileting  - Record patient progress and toleration of activity level   Outcome: Progressing     Problem: DISCHARGE PLANNING  Goal: Discharge to home or other facility with appropriate resources  Description: INTERVENTIONS:  - Identify barriers to discharge w/patient and caregiver  - Arrange for needed discharge resources and transportation as appropriate  - Identify discharge learning needs (meds, wound care, etc )  - Arrange for interpretive services to assist at discharge as needed  - Refer to Case Management Department for coordinating discharge planning if the patient needs post-hospital services based on physician/advanced practitioner order or complex needs related to functional status, cognitive ability, or social support system  Outcome: Progressing     Problem: Knowledge Deficit  Goal: Patient/family/caregiver demonstrates understanding of disease process, treatment plan, medications, and discharge instructions  Description: Complete learning assessment and assess knowledge base    Interventions:  - Provide teaching at level of understanding  - Provide teaching via preferred learning methods  Outcome: Progressing

## 2023-03-25 NOTE — ED PROVIDER NOTES
History  Chief Complaint   Patient presents with   • Elbow Swelling     Pt complains of sudden elbow swelling approx 2 hours prior to arrival  Pt has no pertinent Hx      42 yo M presenting with sudden onset of right elbow swelling, pain, redness approximately 2 hours prior to arrival   Patient states he was feeling fine all day today work, is a  and does a lot of hammering  Patient states he suddenly began to feel not well and had pain and soreness to his right elbow  Patient states he noticed it started to get more swollen  Patient states he had abscess to his right thigh 2 years ago this felt similar so he decided to come to the emergency department  Patient patient has fever here in the emergency department and is exhibiting chills  Patient denies nausea, vomiting, chest pain, shortness of breath, abdominal pain, or any other signs symptoms  History provided by:  Patient      None       History reviewed  No pertinent past medical history  History reviewed  No pertinent surgical history  History reviewed  No pertinent family history  I have reviewed and agree with the history as documented  E-Cigarette/Vaping   • E-Cigarette Use Never User      E-Cigarette/Vaping Substances     Social History     Tobacco Use   • Smoking status: Every Day     Packs/day: 1 00     Types: Cigarettes   • Smokeless tobacco: Never   Vaping Use   • Vaping Use: Never used   Substance Use Topics   • Alcohol use: Yes     Alcohol/week: 6 0 standard drinks     Types: 6 Cans of beer per week   • Drug use: Yes     Types: Marijuana        Review of Systems   Constitutional: Positive for chills and fever  HENT: Negative for ear pain and sore throat  Eyes: Negative for pain and visual disturbance  Respiratory: Negative for cough and shortness of breath  Cardiovascular: Negative for chest pain and palpitations  Gastrointestinal: Negative for abdominal pain and vomiting     Genitourinary: Negative for dysuria and hematuria  Musculoskeletal: Positive for arthralgias and joint swelling  Negative for back pain  Skin: Positive for color change  Negative for rash  Neurological: Negative for seizures and syncope  All other systems reviewed and are negative  Physical Exam  ED Triage Vitals [03/24/23 2112]   Temperature Pulse Respirations Blood Pressure SpO2   99 2 °F (37 3 °C) 96 18 150/79 99 %      Temp Source Heart Rate Source Patient Position - Orthostatic VS BP Location FiO2 (%)   Oral Monitor Sitting Left arm --      Pain Score       8             Orthostatic Vital Signs  Vitals:    03/25/23 1527 03/25/23 2123 03/25/23 2156 03/25/23 2201   BP: 120/64   119/66   Pulse: (!) 107 105 98 94   Patient Position - Orthostatic VS:           Physical Exam  Vitals and nursing note reviewed  Constitutional:       General: He is in acute distress (rigors, appears uncomfortable)  Appearance: He is well-developed  He is ill-appearing  HENT:      Head: Normocephalic and atraumatic  Eyes:      Conjunctiva/sclera: Conjunctivae normal    Cardiovascular:      Rate and Rhythm: Regular rhythm  Tachycardia present  Heart sounds: No murmur heard  Pulmonary:      Effort: Pulmonary effort is normal  No respiratory distress  Breath sounds: Normal breath sounds  Abdominal:      Palpations: Abdomen is soft  Tenderness: There is no abdominal tenderness  Musculoskeletal:         General: No swelling  Right elbow: Swelling and effusion present  Decreased range of motion  Tenderness (diffusely to posterior aspect) present  Cervical back: Neck supple  Comments: Patient's right elbow is edematous, soft, exquisitely tender to palpation, erythema over elbow  Patient has limited extension due to pain  Skin:     General: Skin is warm and dry  Capillary Refill: Capillary refill takes less than 2 seconds  Neurological:      Mental Status: He is alert     Psychiatric:         Mood and Affect: Mood normal          ED Medications  Medications   morphine injection 2 mg (2 mg Intravenous Not Given 3/25/23 0225)   acetaminophen (TYLENOL) tablet 650 mg (650 mg Oral Given 3/25/23 0915)   nicotine (NICODERM CQ) 21 mg/24 hr TD 24 hr patch 1 patch (1 patch Transdermal Not Given 3/25/23 0829)   ketorolac (TORADOL) injection 15 mg (15 mg Intravenous Given 3/26/23 0519)   lactated ringers infusion (100 mL/hr Intravenous New Bag 3/25/23 0827)   ceFAZolin (ANCEF) IVPB (premix in dextrose) 2,000 mg 50 mL (2,000 mg Intravenous New Bag 3/26/23 0519)   ondansetron (ZOFRAN) injection 4 mg (4 mg Intravenous Given 3/26/23 0519)   acetaminophen (TYLENOL) tablet 975 mg (975 mg Oral Given 3/24/23 2322)   lidocaine (PF) (XYLOCAINE-MPF) 1 % injection 5 mL (5 mL Infiltration Given by Other 3/24/23 2321)   ondansetron (ZOFRAN) injection 4 mg (4 mg Intravenous Given 3/25/23 0021)   lactated ringers bolus 1,000 mL (0 mL Intravenous Stopped 3/25/23 0226)   ceftriaxone (ROCEPHIN) 2 g/50 mL in dextrose IVPB (2,000 mg Intravenous New Bag 3/25/23 0233)   ketorolac (TORADOL) injection 15 mg (15 mg Intravenous Given 3/25/23 0230)   vancomycin (VANCOCIN) 2,000 mg in sodium chloride 0 9 % 500 mL IVPB (2,000 mg Intravenous New Bag 3/25/23 0607)       Diagnostic Studies  Results Reviewed     Procedure Component Value Units Date/Time    Body fluid culture and Gram stain [[id number]]  (Abnormal) Collected: 03/25/23 0029    Lab Status: Preliminary result Specimen: Synovial Fluid from Joint, Right Elbow Updated: 03/25/23 1420     Gram Stain Result Rare Polys      2+ Gram positive cocci in pairs    Synovial fluid, crystal [[id number]] Collected: 03/25/23 0029    Lab Status: Final result Specimen: Synovial Fluid from Joint, Right Elbow Updated: 03/25/23 1157     Crystals, Synovial Fluid No Crystals Seen    Blood culture #1 [[id number]] Collected: 03/25/23 0006    Lab Status: Preliminary result Specimen: Blood from Arm, Left Updated: 03/25/23 0801     Blood Culture Received in Microbiology Lab  Culture in Progress  Blood culture #2 [[id number]] Collected: 03/25/23 0006    Lab Status: Preliminary result Specimen: Blood from Arm, Right Updated: 03/25/23 0801     Blood Culture Received in Microbiology Lab  Culture in Progress  High sensitivity CRP [[id number]] Collected: 03/25/23 0006    Lab Status: Final result Specimen: Blood from Arm, Left Updated: 03/25/23 0526     CRP, High Sensitivity 3 72 mg/L     Narrative:            HsCRP Level       Relative Risk           <1 0 mg/L          Low           1 0 to 3 0 mg/L    Average           >3 0 mg/L          High        Synovial Fluid Diff [[id number]] Collected: 03/25/23 0029    Lab Status: Final result Specimen: Synovial Fluid from Joint, Right Elbow Updated: 03/25/23 0451     Total Counted 100     Neutrophil % Synovial 97 %      Eosinophil % Synovial 2 %      Basophil % Synovial 1 %     Synovial fluid white cell count w/ diff [[id number]]  (Abnormal) Collected: 03/25/23 0029    Lab Status: Final result Specimen: Synovial Fluid from Joint, Right Elbow Updated: 03/25/23 0221     Site Right Elbow     Color, Fluid Light Orange     Clarity, Fluid Cloudy     WBC, Fluid 21,160 /ul     CBC and differential [[id number]]  (Abnormal) Collected: 03/25/23 0006    Lab Status: Final result Specimen: Blood from Arm, Left Updated: 03/25/23 0150     WBC 15 72 Thousand/uL      RBC 5 47 Million/uL      Hemoglobin 16 2 g/dL      Hematocrit 47 6 %      MCV 87 fL      MCH 29 6 pg      MCHC 34 0 g/dL      RDW 12 8 %      MPV 9 1 fL      Platelets 022 Thousands/uL     Narrative: This is an appended report  These results have been appended to a previously verified report      Manual Differential(PHLEBS Do Not Order) [[id number]]  (Abnormal) Collected: 03/25/23 0006    Lab Status: Final result Specimen: Blood from Arm, Left Updated: 03/25/23 0150     Segmented % 88 %      Bands % 1 %      Lymphocytes % 5 %      Monocytes % 4 %      Eosinophils, % 2 %      Basophils % 0 %      Absolute Neutrophils 13 99 Thousand/uL      Lymphocytes Absolute 0 79 Thousand/uL      Monocytes Absolute 0 63 Thousand/uL      Eosinophils Absolute 0 31 Thousand/uL      Basophils Absolute 0 00 Thousand/uL      Total Counted --     Anisocytosis Present     Poikilocytes Present     Platelet Estimate Adequate    Sedimentation rate, automated [[id number]]  (Abnormal) Collected: 03/25/23 0006    Lab Status: Final result Specimen: Blood from Arm, Left Updated: 03/25/23 0103     Sed Rate 24 mm/hour     Lactic acid [[id number]]  (Normal) Collected: 03/25/23 0006    Lab Status: Final result Specimen: Blood from Arm, Left Updated: 03/25/23 0047     LACTIC ACID 1 7 mmol/L     Narrative:      Result may be elevated if tourniquet was used during collection  Basic metabolic panel [[id number]] Collected: 03/25/23 0006    Lab Status: Final result Specimen: Blood from Arm, Left Updated: 03/25/23 0043     Sodium 140 mmol/L      Potassium 4 2 mmol/L      Chloride 106 mmol/L      CO2 23 mmol/L      ANION GAP 11 mmol/L      BUN 14 mg/dL      Creatinine 0 86 mg/dL      Glucose 105 mg/dL      Calcium 10 1 mg/dL      eGFR 111 ml/min/1 73sq m     Narrative:      [place] guidelines for Chronic Kidney Disease (CKD):   •  Stage 1 with normal or high GFR (GFR > 90 mL/min/1 73 square meters)  •  Stage 2 Mild CKD (GFR = 60-89 mL/min/1 73 square meters)  •  Stage 3A Moderate CKD (GFR = 45-59 mL/min/1 73 square meters)  •  Stage 3B Moderate CKD (GFR = 30-44 mL/min/1 73 square meters)  •  Stage 4 Severe CKD (GFR = 15-29 mL/min/1 73 square meters)  •  Stage 5 End Stage CKD (GFR <15 mL/min/1 73 square meters)  Note: GFR calculation is accurate only with a steady state creatinine                 XR elbow 2 vw RIGHT   ED Interpretation by Garcia Aldridge DO (03/25 0158)   Soft tissue swelling  No acute bony abnormality visualized        Final Result by Carmen Guevara MD (03/25 1146)      No acute osseous abnormality  Workstation performed: [id number]               Procedures  General Procedure    Date/Time: 3/25/2023 12:00 AM  Performed by: Evy Sommers DO  Authorized by: Evy Sommers DO     Patient location:  ED  Assisting Provider(s): Yes (comment) (Dr Suzy Ferro)    Consent:     Consent obtained:  Verbal    Consent given by:  Patient    Risks discussed:  Bleeding, infection, pain, incomplete drainage and nerve damage    Alternatives discussed:  No treatment  Indications:     Indications:  Erythema, swelling, pain, febrile  Pre-procedure details:     Skin preparation:  ChloraPrep  Anesthesia (see MAR for exact dosages): Anesthesia method:  Local infiltration    Local anesthetic:  Lidocaine 1% w/o epi  Procedure Detail:     Equipment used:  23 gauge needle, syringe    Procedure note (site, laterality, method, findings):  R elbow, ultrasound guided bursa aspiration  Post-procedure details:     Patient tolerance of procedure: Tolerated well, no immediate complications          ED Course  ED Course as of 03/26/23 0530   Fri Mar 24, 2023   2238 Blood Pressure: 150/79   2238 Temperature: 99 2 °F (37 3 °C)  Oral temp   2238 Pulse: 96   2238 Respirations: 18   2238 SpO2: 99 %   2238 40 yo M presenting with sudden onset of right elbow swelling, pain, redness approximately 2 hours prior to arrival   Patient states he was feeling fine all day today work, is a  and does a lot of hammering  Patient states he suddenly began to feel not well and had pain and soreness to his right elbow  Patient states he noticed it started to get more swollen  Patient states he had abscess to his right thigh 2 years ago this felt similar so he decided to come to the emergency department  Patient patient has fever here in the emergency department and is exhibiting chills    Patient denies nausea, vomiting, chest pain, shortness of breath, abdominal pain, or any other signs symptoms  Physical exam: Patient is having rigors and is hot to touch  Patient's right elbow is edematous, soft, exquisitely tender to palpation, erythema over elbow  Patient has limited extension due to pain  Heart is slightly tachycardic, lungs are clear to auscultation bilaterally  Concern for: Septic bursitis versus bursitis versus septic joint  2248 Temperature: 100 3 °F (37 9 °C)  Oral temp   Sat Mar 25, 2023   0033 WBC(!): 15 72   0121 Sed Rate(!): 24   9107 Basic metabolic panel  Within normal limits  0121 LACTIC ACID: 1 7   0156 Manual Differential(PHLEBS Do Not Order)(!)  Relative bands: 1  Elevated neutrophils  Otherwise unremarkable  0158 XR elbow 2 vw RIGHT  Soft tissue swelling  No osseous abnormality visualized  0222 WBC, Fluid(!): 21,160   0224 Clarity, Fluid(!): Cloudy   0227 Spoke with admitting team   Agreed with inpatient admission  This was relayed to patient  He expressed understanding  Results were discussed with patient  He expressed understanding  Patient was given the opportunity ask questions in the emergency department  All questions concerns were addressed the emergency department  Initial Sepsis Screening     Row Name 03/26/23 0529                Is the patient's history suggestive of a new or worsening infection? Yes (Proceed)  -KS        Suspected source of infection soft tissue;infected joint  -KS        Indicate SIRS criteria Tachycardia > 90 bpm;Leukocytosis (WBC > 80050 IJL) OR Leukopenia (WBC <4000 IJL) OR Bandemia (WBC >10% bands)  -KS        Are two or more of the above signs & symptoms of infection both present and new to the patient? No  -KS        Assess for evidence of organ dysfunction: Are any of the below criteria present within 6 hours of suspected infection and SIRS criteria that are NOT considered to be chronic conditions?  --              User Key  (r) = Recorded By, (t) = Taken By, (c) = Cosigned By    Isac Gandara Name Provider Type    DIGNA Escalona DO Resident                          Medical Decision Making  See ED course for more detailed medical decision making    Amount and/or Complexity of Data Reviewed  Labs: ordered  Decision-making details documented in ED Course  Radiology: ordered and independent interpretation performed  Decision-making details documented in ED Course  Risk  OTC drugs  Prescription drug management  Decision regarding hospitalization  Disposition  Final diagnoses:   Septic bursitis of elbow, right   Leukocytosis     Time reflects when diagnosis was documented in both MDM as applicable and the Disposition within this note     Time User Action Codes Description Comment    3/25/2023  2:30 AM Butchpk Syed Add [M71 10] Septic bursitis     3/25/2023  2:30 AM Juanpablo Wright N Remove [M71 10] Septic bursitis     3/25/2023  2:30 AM Juanpablo Wright N Add [C79 095] Septic bursitis of elbow, right     3/25/2023  2:30 AM Butch Kalata Add [Q30 396] Leukocytosis       ED Disposition     ED Disposition   Admit    Condition   Stable    Date/Time   Sat Mar 25, 2023  2:31 AM    Comment   Case was discussed with TR and the patient's admission status was agreed to be Admission Status: inpatient status to the service of Dr Wanda Jordan   Follow-up Information    None         There are no discharge medications for this patient  No discharge procedures on file  PDMP Review     None           ED Provider  Attending physically available and evaluated Adeline Bingham I managed the patient along with the ED Attending      Electronically Signed by         Romaine Escalona DO  03/26/23 7246

## 2023-03-25 NOTE — ASSESSMENT & PLAN NOTE
· Presented with sudden right elbow erythema, pain, swelling, rigors  Meet sepsis  · Synovial fluid: Cloudy, WBC 21,000  Culture, Gram stain pending  · Sed rate: 24, CRP pending  · Antibiotics: Rocephin  Continue with ancef and also add MRSA while awaiting culture    Consult pharmacy  · Pain control  · Consider ortho consult for ongoing needle aspiration pending response to antibiotic

## 2023-03-25 NOTE — ASSESSMENT & PLAN NOTE
· POA with leukocytosis, tachycardia  Lactate normal   · Source: Bursitis right elbow  · Cultures  · Continue Rocephin x1    Continue with ancef, vanco

## 2023-03-25 NOTE — ED ATTENDING ATTESTATION
3/24/2023  I, Jomar Machado MD, saw and evaluated the patient  I have discussed the patient with the resident/non-physician practitioner and agree with the resident's/non-physician practitioner's findings, Plan of Care, and MDM as documented in the resident's/non-physician practitioner's note, except where noted  All available labs and Radiology studies were reviewed  I was present for key portions of any procedure(s) performed by the resident/non-physician practitioner and I was immediately available to provide assistance  At this point I agree with the current assessment done in the Emergency Department  I have conducted an independent evaluation of this patient a history and physical is as follows: This is a 53-year-old male patient with a relevant past medical history of tobacco and alcohol use, presenting to the ED today for complaint of right elbow pain  His elbow pain has been present for the past 2 hours  His elbow pain is nonradiating, and associated with some significant swelling of his elbow  He denies any trauma to the area that he knows of  Patient also has a fever, up to 100 °F, with rigors on exam   He has significant swelling approximately 5 cm in diameter with some fluctuance, and some slight induration  On point-of-care ultrasound interpreted by myself he does have a small fluid collection, in the bursa region, with surrounding cobblestoning  His differential diagnosis includes: Septic bursitis versus traumatic bursitis versus superficial abscess versus cellulitis versus other  Patient underwent a bursa aspiration performed by myself and my resident yielding cloudy, serosanguineous material   He underwent multiple investigations interpreted by myself including a CBC showing a leukocytosis, with a left shift  His metabolic panel for the most part was unremarkable    His sed rate was 24, lactic acid was 1 7, had blood cultures drawn, and was requested to be hospitalized by the hospitalist provider after receiving Rocephin here in the ED  Hospitalist accepted without any further orders requested      ED Course         Critical Care Time  Procedures

## 2023-03-26 ENCOUNTER — APPOINTMENT (INPATIENT)
Dept: CT IMAGING | Facility: HOSPITAL | Age: 38
End: 2023-03-26

## 2023-03-26 PROBLEM — M71.121 SEPTIC BURSITIS OF ELBOW, RIGHT: Status: ACTIVE | Noted: 2023-03-25

## 2023-03-26 LAB
BASOPHILS # BLD MANUAL: 0 THOUSAND/UL (ref 0–0.1)
BASOPHILS NFR MAR MANUAL: 0 % (ref 0–1)
EOSINOPHIL # BLD MANUAL: 0.24 THOUSAND/UL (ref 0–0.4)
EOSINOPHIL NFR BLD MANUAL: 1 % (ref 0–6)
ERYTHROCYTE [DISTWIDTH] IN BLOOD BY AUTOMATED COUNT: 13 % (ref 11.6–15.1)
HCT VFR BLD AUTO: 41.1 % (ref 36.5–49.3)
HGB BLD-MCNC: 14 G/DL (ref 12–17)
LYMPHOCYTES # BLD AUTO: 0.96 THOUSAND/UL (ref 0.6–4.47)
LYMPHOCYTES # BLD AUTO: 4 % (ref 14–44)
MCH RBC QN AUTO: 30 PG (ref 26.8–34.3)
MCHC RBC AUTO-ENTMCNC: 34.1 G/DL (ref 31.4–37.4)
MCV RBC AUTO: 88 FL (ref 82–98)
MONOCYTES # BLD AUTO: 1.44 THOUSAND/UL (ref 0–1.22)
MONOCYTES NFR BLD: 6 % (ref 4–12)
NEUTROPHILS # BLD MANUAL: 21.42 THOUSAND/UL (ref 1.85–7.62)
NEUTS BAND NFR BLD MANUAL: 10 % (ref 0–8)
NEUTS SEG NFR BLD AUTO: 79 % (ref 43–75)
PLATELET # BLD AUTO: 209 THOUSANDS/UL (ref 149–390)
PLATELET BLD QL SMEAR: ADEQUATE
PMV BLD AUTO: 9.4 FL (ref 8.9–12.7)
RBC # BLD AUTO: 4.67 MILLION/UL (ref 3.88–5.62)
RBC MORPH BLD: NORMAL
TOXIC GRANULES BLD QL SMEAR: PRESENT
WBC # BLD AUTO: 24.07 THOUSAND/UL (ref 4.31–10.16)

## 2023-03-26 PROCEDURE — 0M930ZZ DRAINAGE OF RIGHT ELBOW BURSA AND LIGAMENT, OPEN APPROACH: ICD-10-PCS | Performed by: ORTHOPAEDIC SURGERY

## 2023-03-26 RX ORDER — LIDOCAINE HYDROCHLORIDE 10 MG/ML
10 INJECTION, SOLUTION EPIDURAL; INFILTRATION; INTRACAUDAL; PERINEURAL ONCE
Status: COMPLETED | OUTPATIENT
Start: 2023-03-26 | End: 2023-03-26

## 2023-03-26 RX ORDER — CEFAZOLIN SODIUM 2 G/50ML
2000 SOLUTION INTRAVENOUS EVERY 8 HOURS
Status: DISCONTINUED | OUTPATIENT
Start: 2023-03-26 | End: 2023-03-27

## 2023-03-26 RX ADMIN — LIDOCAINE HYDROCHLORIDE 10 ML: 10 INJECTION, SOLUTION EPIDURAL; INFILTRATION; INTRACAUDAL at 10:17

## 2023-03-26 RX ADMIN — KETOROLAC TROMETHAMINE 15 MG: 30 INJECTION, SOLUTION INTRAMUSCULAR at 05:19

## 2023-03-26 RX ADMIN — ONDANSETRON 4 MG: 2 INJECTION INTRAMUSCULAR; INTRAVENOUS at 05:19

## 2023-03-26 RX ADMIN — CEFAZOLIN SODIUM 2000 MG: 2 SOLUTION INTRAVENOUS at 05:19

## 2023-03-26 RX ADMIN — ONDANSETRON 4 MG: 2 INJECTION INTRAMUSCULAR; INTRAVENOUS at 17:57

## 2023-03-26 RX ADMIN — VANCOMYCIN HYDROCHLORIDE 2000 MG: 5 INJECTION, POWDER, LYOPHILIZED, FOR SOLUTION INTRAVENOUS at 12:08

## 2023-03-26 RX ADMIN — IOHEXOL 100 ML: 350 INJECTION, SOLUTION INTRAVENOUS at 15:49

## 2023-03-26 RX ADMIN — KETOROLAC TROMETHAMINE 15 MG: 30 INJECTION, SOLUTION INTRAMUSCULAR at 17:57

## 2023-03-26 RX ADMIN — CEFAZOLIN SODIUM 2000 MG: 2 SOLUTION INTRAVENOUS at 23:30

## 2023-03-26 RX ADMIN — CEFAZOLIN SODIUM 2000 MG: 2 SOLUTION INTRAVENOUS at 16:57

## 2023-03-26 RX ADMIN — ACETAMINOPHEN 650 MG: 325 TABLET ORAL at 10:51

## 2023-03-26 RX ADMIN — ACETAMINOPHEN 650 MG: 325 TABLET ORAL at 23:35

## 2023-03-26 NOTE — UTILIZATION REVIEW
Initial Clinical Review    Admission: Date/Time/Statement:   Admission Orders (From admission, onward)     Ordered        03/25/23 0232  INPATIENT ADMISSION  Once                      Orders Placed This Encounter   Procedures   • INPATIENT ADMISSION     Standing Status:   Standing     Number of Occurrences:   1     Order Specific Question:   Level of Care     Answer:   Med Surg [16]     Order Specific Question:   Estimated length of stay     Answer:   More than 2 Midnights     Order Specific Question:   Certification     Answer:   I certify that inpatient services are medically necessary for this patient for a duration of greater than two midnights  See H&P and MD Progress Notes for additional information about the patient's course of treatment  ED Arrival Information     Expected   -    Arrival   3/24/2023 21:06    Acuity   Urgent            Means of arrival   Walk-In    Escorted by   Family Member    Service   Hospitalist    Admission type   Emergency            Arrival complaint   Elbow swelling/Possible infection            Chief Complaint   Patient presents with   • Elbow Swelling     Pt complains of sudden elbow swelling approx 2 hours prior to arrival  Pt has no pertinent Hx  Initial Presentation: 41 y o  male  To ER from home  Reports  sudden onset of right elbow swelling, pain, redness approximately 2 hours prior to arrival   Patient states he was feeling fine all day today work, is a  and does a lot of hammering  IN ER,  fever, up to 100 °F, with rigors on exam,  significant swelling approximately 5 cm in diameter with some fluctuance, and some slight induration  US ELBOW  Reveals small fluid collection, in the bursa region, with surrounding cobblestoning  underwent an aspiration of the bursa in ER -  yielding cloudy, serosanguineous material   CBC showing a leukocytosis, with a left shift  His metabolic panel for the most part was unremarkable    His sed rate was 24, lactic acid was 1 7     leukocytosis, tachycardia  Lactate normal    Admit IP status,   MS Level of care for management of   Sepsis  2/2  right elbow septic bursitis/cellulitis; Cont IV Cefazolin,  Serial assessments, toradol for pain, consult Ortho     Date: 3/26      Day 2:   Patient with worsening swelling, erythema, leukocytosis and persistent fever this morning  ( Tmax 102 7 °F and tachycardia up to 101)   Orthopedic surgery consulted for I&D which was done at the bedside on 3/26/2023  CT of RUE ordered to evaluate for deeper infection - Switched to IV vancomycin pending additional culture and sensitivity results      Blood cultures negative x24 hours  3/26 -  right elbow aspirate cultures reviewed and shows beta-hemolytic strep group A    Patient will be resumed on IV cefazolin at this time/ cont IV Vanc      ED Triage Vitals [03/24/23 2112]   Temperature Pulse Respirations Blood Pressure SpO2   99 2 °F (37 3 °C) 96 18 150/79 99 %      Temp Source Heart Rate Source Patient Position - Orthostatic VS BP Location FiO2 (%)   Oral Monitor Sitting Left arm --      Pain Score       8          Wt Readings from Last 1 Encounters:   03/25/23 82 9 kg (182 lb 11 2 oz)     Additional Vital Signs:   03/26/23 09:54:46 99 1 °F (37 3 °C) 101 16 99/64 76 96 % --   03/26/23 00:29:42 99 7 °F (37 6 °C) -- -- -- -- -- --   03/25/23 22:01:28 99 5 °F (37 5 °C) 94 16 119/66 84 94 % --   03/25/23 21:56:24 100 3 °F (37 9 °C) 98 -- -- -- 96 % --   03/25/23 21:23:07 102 7 °F (39 3 °C) Abnormal  105 -- -- -- 94 % --   03/25/23 15:27:48 100 1 °F (37 8 °C) 107 Abnormal  17 120/64 83 95 % --   03/25/23 1100 -- -- -- -- -- 95 % None (Room air)   03/25/23 09:19:47 100 3 °F (37 9 °C) 113 Abnormal  -- -- -- 95 % --   03/25/23 08:30:48 102 6 °F (39 2 °C) Abnormal  107 Abnormal  20 100/49 Abnormal  66 96 % --   03/25/23 05:24:55 102 7 °F (39 3 °C) Abnormal  110 Abnormal  -- -- -- 94 % --   03/25/23 03:43:33 102 7 °F (39 3 °C) Abnormal   98 -- 128/72 -- 97 % --   03/25/23 0300 99 7 °F (37 6 °C) -- -- -- -- -- --   03/25/23 0245 -- 112 Abnormal  20 136/70 94 95 % None (Room air)   03/25/23 0230 -- 105 20 136/70 -- 96 % None (Room air)   03/25/23 0114 -- 112 Abnormal  18 134/64 -- 97 % None (Room air)   03/24/23 2242 100 3 °F (37 9 °C) -- -- -- -- -- --     Pertinent Labs/Diagnostic Test Results:   ekg none     XR elbow 2 vw RIGHT   ED Interpretation by Yesi Fountain DO (03/25 0158)   Soft tissue swelling  No acute bony abnormality visualized  Final Result by Valentino Spurling, MD (03/25 1146)      No acute osseous abnormality  CT upper extremity w contrast right    (Results Pending)         Results from last 7 days   Lab Units 03/26/23  1417 03/25/23  0431 03/25/23  0006   WBC Thousand/uL 24 07* 22 32* 15 72*   HEMOGLOBIN g/dL 14 0 15 4 16 2   HEMATOCRIT % 41 1 45 2 47 6   PLATELETS Thousands/uL 209 274 327   NEUTROS ABS Thousands/µL  --  20 02*  --    BANDS PCT %  --   --  1         Results from last 7 days   Lab Units 03/25/23  0431 03/25/23  0006   SODIUM mmol/L 137 140   POTASSIUM mmol/L 3 8 4 2   CHLORIDE mmol/L 106 106   CO2 mmol/L 23 23   ANION GAP mmol/L 8 11   BUN mg/dL 11 14   CREATININE mg/dL 0 88 0 86   EGFR ml/min/1 73sq m 110 111   CALCIUM mg/dL 9 1 10 1             Results from last 7 days   Lab Units 03/25/23  0431 03/25/23  0006   GLUCOSE RANDOM mg/dL 108 105     Results from last 7 days   Lab Units 03/25/23  0006   LACTIC ACID mmol/L 1 7     Results from last 7 days   Lab Units 03/25/23  0006   SED RATE mm/hour 24*     Results from last 7 days   Lab Units 03/25/23  0029 03/25/23  0006   BLOOD CULTURE   --  No Growth at 24 hrs  No Growth at 24 hrs     GRAM STAIN RESULT  Rare Polys*  2+ Gram positive cocci in pairs*  --    BODY FLUID CULTURE, STERILE  3+ Growth of Beta Hemolytic Streptococcus Group A*  --      Results from last 7 days   Lab Units 03/25/23  0029   TOTAL COUNTED  100   NEUTROPHIL % (SYNOVIAL) % 97 WBC FLUID /ul 21,160*   CRYSTALS, SYNOVIAL FLUID  No Crystals Seen           ED Treatment:   Medication Administration from 03/24/2023 2106 to 03/25/2023 0340       Date/Time Order Dose Route Action     03/24/2023 2322 EDT acetaminophen (TYLENOL) tablet 975 mg 975 mg Oral Given     03/25/2023 0021 EDT ondansetron (ZOFRAN) injection 4 mg 4 mg Intravenous Given     03/25/2023 5896 EDT lactated ringers bolus 1,000 mL 1,000 mL Intravenous New Bag     03/25/2023 0233 EDT ceftriaxone (ROCEPHIN) 2 g/50 mL in dextrose IVPB 2,000 mg Intravenous New Bag     03/25/2023 0230 EDT ketorolac (TORADOL) injection 15 mg 15 mg Intravenous Given        Admitting Diagnosis: Leukocytosis [D72 829]  Septic bursitis of elbow, right [M71 121]  Age/Sex: 41 y o  male  Admission Orders:    See above note    Scheduled Medications:  cefazolin, 2,000 mg, Intravenous, Q8H  morphine injection, 2 mg, Intravenous, Once  nicotine, 1 patch, Transdermal, Daily      Continuous IV Infusions:     PRN Meds:  acetaminophen, 650 mg, Oral, Q6H PRN  ketorolac, 15 mg, Intravenous, Q6H PRN  ondansetron, 4 mg, Intravenous, Q6H PRN        IP CONSULT TO ORTHOPEDIC SURGERY    Network Utilization Review Department  ATTENTION: Please call with any questions or concerns to [phone] and carefully listen to the prompts so that you are directed to the right person  All voicemails are confidential   [place] all requests for admission clinical reviews, approved or denied determinations and any other requests to dedicated fax number below belonging to the campus where the patient is receiving treatment   List of dedicated fax numbers for the Facilities:  [address] [place] [address] DENIALS (Administrative/Medical Necessity) [phone]   [address] [place] [address] (Maternity/NICU/Pediatrics) [phone]   [address] [phone]   [place] [phone]     [address] [place] [address] [facility] [place] [address] [facility] [address] [facility] 310 [facility] [address] [phone]

## 2023-03-26 NOTE — QUICK NOTE
Updated initial right elbow aspirate cultures reviewed and shows beta-hemolytic strep group A  Patient will be resumed on IV cefazolin at this time  He is status post I&D this morning

## 2023-03-26 NOTE — ASSESSMENT & PLAN NOTE
· POA, presented with sudden onset right elbow erythema, pain, swelling, rigors  · Met sepsis criteria on admission as documented below  · Status post aspiration of bursa in the ED   Synovial fluid: Cloudy, WBC 21,000, GPC in pairs  · Mildly elevated sed rate and CRP  · Initially placed on high-dose cefazolin 2 g q 8 hours  · Patient with worsening swelling, erythema, leukocytosis and persistent fever this morning  · Orthopedic surgery consulted for I&D which was done at the bedside on 3/26/2023  · CT of the right upper extremity ordered to evaluate for deeper infection and currently pending  · Switched to IV vancomycin pending additional culture and sensitivity results

## 2023-03-26 NOTE — PROCEDURES
"Incision and drain    Date/Time: 3/26/2023 10:19 AM  Performed by: Torin Devlin DO  Authorized by: Torin Devlin DO   Universal Protocol:  Consent: Verbal consent obtained  Risks and benefits: risks, benefits and alternatives were discussed  Consent given by: patient  Time out: Immediately prior to procedure a \"time out\" was called to verify the correct patient, procedure, equipment, support staff and site/side marked as required  Patient understanding: patient states understanding of the procedure being performed  Test results: test results available and properly labeled  Site marked: the operative site was marked    Patient location:  Bedside  Location:     Type:  Bursa    Size:  2-3 cm    Location: elbow  Pre-procedure details:     Skin preparation:  Chloraprep  Anesthesia (see MAR for exact dosages): Anesthesia method:  Local infiltration    Local anesthetic:  Lidocaine 1% WITH epi  Procedure details:     Complexity:  Simple    Incision types:  Stab incision    Scalpel blade:  15    Approach:  Open    Incision depth:  Deep    Wound management:  Probed and deloculated and irrigated with saline    Drainage:  Bloody, serous and purulent    Drainage amount: Moderate    Wound treatment:  Wound left open and packing placed    Packing materials:  1/4 in iodoform gauze  Post-procedure details:     Patient tolerance of procedure:   Tolerated well, no immediate complications  Comments:      Dressing of 4x4 and ace wrap            "

## 2023-03-26 NOTE — PROGRESS NOTES
[facility]  Progress Note  Name: Jeremy Murillo  MRN: [id number]  Unit/Bed#: S -54 I Date of Admission: 3/24/2023   Date of Service: 3/26/2023 I Hospital Day: 1    Assessment/Plan   * Septic bursitis of elbow, right  Assessment & Plan  · POA, presented with sudden onset right elbow erythema, pain, swelling, rigors  · Met sepsis criteria on admission as documented below  · Status post aspiration of bursa in the ED  Synovial fluid: Cloudy, WBC 21,000, GPC in pairs  · Mildly elevated sed rate and CRP  · Initially placed on high-dose cefazolin 2 g q 8 hours  · Patient with worsening swelling, erythema, leukocytosis and persistent fever this morning  · Orthopedic surgery consulted for I&D which was done at the bedside on 3/26/2023  · CT of the right upper extremity ordered to evaluate for deeper infection and currently pending  · Switched to IV vancomycin pending additional culture and sensitivity results    Sepsis ([facility] [address] )  Assessment & Plan  · POA, evidenced by leukocytosis, tachycardia and evidence of right elbow bursitis  · Worsening leukocytosis today with intermittent fever, Tmax 102 7 °F and tachycardia up to 101  · Blood cultures negative x24 hours  · Initial wound cultures growing gram-positive cocci in pairs  · Had I&D done at the bedside by orthopedic surgery today   · Follow-up on abscess cultures sent today   · Continue IV vancomycin    Nicotine dependence  Assessment & Plan  · Continue NicoDerm patch  · Smoking cessation counseling       VTE Pharmacologic Prophylaxis:   Pharmacologic: Ambulate ad cristóbal  Mechanical VTE Prophylaxis in Place: Yes    Patient Centered Rounds: I have performed bedside rounds with nursing staff today  Discussions with Specialists or Other Care Team Provider: Nursing/orthopedic surgery    Education and Discussions with Family / Patient: Patient    Current Length of Stay: 1 day(s)    Current Patient Status: Inpatient   Certification Statement:  The patient will continue to require additional inpatient hospital stay due to Above diagnosis and care plan    Discharge Plan: Pending clinical improvement    Code Status: Level 1 - Full Code      Subjective: The patient reports fever overnight  Otherwise he is without any new complaints  Objective:     Vitals:   Temp (24hrs), Av 2 °F (37 9 °C), Min:99 1 °F (37 3 °C), Max:102 7 °F (39 3 °C)    Temp:  [99 1 °F (37 3 °C)-102 7 °F (39 3 °C)] 99 1 °F (37 3 °C)  HR:  [] 101  Resp:  [16-17] 16  BP: ()/(64-66) 99/64  SpO2:  [94 %-96 %] 96 %  Body mass index is 24 78 kg/m²  Input and Output Summary (last 24 hours):     No intake or output data in the 24 hours ending 23 1245    Physical Exam:  General Appearance:    Alert, cooperative, no distress, not ill or toxic appearing, appropriately responsive    Head:    Normocephalic, without obvious abnormality, atraumatic, mucous membranes moist    Eyes:    Conjunctiva/corneas clear, EOM's intact   Neck:   Supple   Resp:    Nonlabored   Abdomen:    Nondistended   Extremities:  Right elbow with worsening swelling, increasing erythema and tenderness compared to yesterday  No lower extremity edema   Neurologic:  Alert and oriented x3, moving all extremities, ambulating independently in his room, speech is clear  Additional Data:     Labs:    Results from last 7 days   Lab Units 23  0431   WBC Thousand/uL 22 32*   HEMOGLOBIN g/dL 15 4   HEMATOCRIT % 45 2   PLATELETS Thousands/uL 274   NEUTROS PCT % 89*   LYMPHS PCT % 2*   MONOS PCT % 6   EOS PCT % 0     Results from last 7 days   Lab Units 23  0431   POTASSIUM mmol/L 3 8   CHLORIDE mmol/L 106   CO2 mmol/L 23   BUN mg/dL 11   CREATININE mg/dL 0 88   CALCIUM mg/dL 9 1           * I Have Reviewed All Lab Data Listed Above  * Additional Pertinent Lab Tests Reviewed:  [address] [address] Admission Reviewed    Cultures:   Blood Culture:   Lab Results   Component Value Date BLOODCX No Growth at 24 hrs  03/25/2023    BLOODCX No Growth at 24 hrs  03/25/2023     Urine Culture: No results found for: URINECX  Sputum Culture: No components found for: SPUTUMCX  Wound Culture:   Lab Results   Component Value Date    WOUNDCULT Growth in Broth culture only 06/19/2021       Last 24 Hours Medication List:   Current Facility-Administered Medications   Medication Dose Route Frequency Provider Last Rate   • acetaminophen  650 mg Oral Q6H PRN ALVINA Huang     • ketorolac  15 mg Intravenous Q6H PRN Radha Du MD     • morphine injection  2 mg Intravenous Once Deven Herrera DO     • nicotine  1 patch Transdermal Daily ALVINA Huang     • ondansetron  4 mg Intravenous Q6H PRN Ana Laura Flores PA-C     • vancomycin  25 mg/kg Intravenous Once Radha Du MD 2,000 mg (03/26/23 1208)        Today, Patient Was Seen By: Radha Du MD    ** Please Note: Dragon 360 Dictation voice to text software may have been used in the creation of this document   **

## 2023-03-26 NOTE — ASSESSMENT & PLAN NOTE
· POA, evidenced by leukocytosis, tachycardia and evidence of right elbow bursitis  · Worsening leukocytosis today with intermittent fever, Tmax 102 7 °F and tachycardia up to 101  · Blood cultures negative x24 hours  · Initial wound cultures growing gram-positive cocci in pairs  · Had I&D done at the bedside by orthopedic surgery today   · Follow-up on abscess cultures sent today   · Continue IV vancomycin

## 2023-03-27 LAB
ANION GAP SERPL CALCULATED.3IONS-SCNC: 8 MMOL/L (ref 4–13)
BACTERIA SPEC BFLD CULT: ABNORMAL
BASOPHILS # BLD MANUAL: 0 THOUSAND/UL (ref 0–0.1)
BASOPHILS NFR MAR MANUAL: 0 % (ref 0–1)
BUN SERPL-MCNC: 10 MG/DL (ref 5–25)
CALCIUM SERPL-MCNC: 8.7 MG/DL (ref 8.4–10.2)
CHLORIDE SERPL-SCNC: 104 MMOL/L (ref 96–108)
CO2 SERPL-SCNC: 24 MMOL/L (ref 21–32)
CREAT SERPL-MCNC: 0.93 MG/DL (ref 0.6–1.3)
EOSINOPHIL # BLD MANUAL: 0.94 THOUSAND/UL (ref 0–0.4)
EOSINOPHIL NFR BLD MANUAL: 5 % (ref 0–6)
ERYTHROCYTE [DISTWIDTH] IN BLOOD BY AUTOMATED COUNT: 12.9 % (ref 11.6–15.1)
GFR SERPL CREATININE-BSD FRML MDRD: 105 ML/MIN/1.73SQ M
GLUCOSE SERPL-MCNC: 116 MG/DL (ref 65–140)
GRAM STN SPEC: ABNORMAL
GRAM STN SPEC: ABNORMAL
HCT VFR BLD AUTO: 40.6 % (ref 36.5–49.3)
HGB BLD-MCNC: 14.2 G/DL (ref 12–17)
LYMPHOCYTES # BLD AUTO: 1.5 THOUSAND/UL (ref 0.6–4.47)
LYMPHOCYTES # BLD AUTO: 8 % (ref 14–44)
MCH RBC QN AUTO: 30.5 PG (ref 26.8–34.3)
MCHC RBC AUTO-ENTMCNC: 35 G/DL (ref 31.4–37.4)
MCV RBC AUTO: 87 FL (ref 82–98)
MONOCYTES # BLD AUTO: 0.19 THOUSAND/UL (ref 0–1.22)
MONOCYTES NFR BLD: 1 % (ref 4–12)
NEUTROPHILS # BLD MANUAL: 16.15 THOUSAND/UL (ref 1.85–7.62)
NEUTS BAND NFR BLD MANUAL: 12 % (ref 0–8)
NEUTS SEG NFR BLD AUTO: 74 % (ref 43–75)
PLATELET # BLD AUTO: 226 THOUSANDS/UL (ref 149–390)
PLATELET BLD QL SMEAR: ADEQUATE
PMV BLD AUTO: 9.6 FL (ref 8.9–12.7)
POTASSIUM SERPL-SCNC: 3 MMOL/L (ref 3.5–5.3)
RBC # BLD AUTO: 4.66 MILLION/UL (ref 3.88–5.62)
RBC MORPH BLD: NORMAL
RHODAMINE-AURAMINE STN SPEC: NORMAL
SODIUM SERPL-SCNC: 136 MMOL/L (ref 135–147)
WBC # BLD AUTO: 18.78 THOUSAND/UL (ref 4.31–10.16)

## 2023-03-27 RX ORDER — LOPERAMIDE HYDROCHLORIDE 2 MG/1
2 CAPSULE ORAL EVERY 4 HOURS PRN
Status: DISCONTINUED | OUTPATIENT
Start: 2023-03-27 | End: 2023-03-31 | Stop reason: HOSPADM

## 2023-03-27 RX ORDER — CEFAZOLIN SODIUM 2 G/50ML
2000 SOLUTION INTRAVENOUS EVERY 6 HOURS
Status: DISCONTINUED | OUTPATIENT
Start: 2023-03-27 | End: 2023-03-28

## 2023-03-27 RX ORDER — SACCHAROMYCES BOULARDII 250 MG
250 CAPSULE ORAL 2 TIMES DAILY
Status: DISCONTINUED | OUTPATIENT
Start: 2023-03-27 | End: 2023-03-31 | Stop reason: HOSPADM

## 2023-03-27 RX ORDER — KETOROLAC TROMETHAMINE 30 MG/ML
15 INJECTION, SOLUTION INTRAMUSCULAR; INTRAVENOUS ONCE
Status: COMPLETED | OUTPATIENT
Start: 2023-03-27 | End: 2023-03-27

## 2023-03-27 RX ADMIN — KETOROLAC TROMETHAMINE 15 MG: 30 INJECTION, SOLUTION INTRAMUSCULAR at 00:23

## 2023-03-27 RX ADMIN — ONDANSETRON 4 MG: 2 INJECTION INTRAMUSCULAR; INTRAVENOUS at 10:55

## 2023-03-27 RX ADMIN — Medication 250 MG: at 10:48

## 2023-03-27 RX ADMIN — KETOROLAC TROMETHAMINE 15 MG: 30 INJECTION, SOLUTION INTRAMUSCULAR at 06:17

## 2023-03-27 RX ADMIN — Medication 250 MG: at 18:44

## 2023-03-27 RX ADMIN — ONDANSETRON 4 MG: 2 INJECTION INTRAMUSCULAR; INTRAVENOUS at 00:23

## 2023-03-27 RX ADMIN — CEFAZOLIN SODIUM 2000 MG: 2 SOLUTION INTRAVENOUS at 14:40

## 2023-03-27 RX ADMIN — CEFAZOLIN SODIUM 2000 MG: 2 SOLUTION INTRAVENOUS at 07:23

## 2023-03-27 RX ADMIN — KETOROLAC TROMETHAMINE 15 MG: 30 INJECTION, SOLUTION INTRAMUSCULAR at 18:44

## 2023-03-27 RX ADMIN — ACETAMINOPHEN 650 MG: 325 TABLET ORAL at 10:55

## 2023-03-27 RX ADMIN — ONDANSETRON 4 MG: 2 INJECTION INTRAMUSCULAR; INTRAVENOUS at 06:18

## 2023-03-27 RX ADMIN — CEFAZOLIN SODIUM 2000 MG: 2 SOLUTION INTRAVENOUS at 20:45

## 2023-03-27 NOTE — ASSESSMENT & PLAN NOTE
· POA, evidenced by leukocytosis, tachycardia and evidence of right elbow bursitis  · Worsening leukocytosis today with intermittent fever, Tmax 102 7 °F and tachycardia up to 101  · Blood cultures negative x24 hours  · Initial wound cultures growing gram-positive cocci in pairs  · Had I&D done at the bedside by orthopedic surgery today   See plan above for bursitis

## 2023-03-27 NOTE — ASSESSMENT & PLAN NOTE
· POA, presented with sudden onset right elbow erythema, pain, swelling, rigors  · Met sepsis criteria on admission as documented below  · Status post aspiration of bursa in the ED  Synovial fluid: Cloudy, WBC 21,000, GPC in pairs  · Mildly elevated sed rate and CRP  · Initially placed on high-dose cefazolin 2 g q 8 hours  · 3/26: Patient with worsening swelling, erythema, leukocytosis and persistent fever this morning  · Orthopedic surgery consulted for I&D which was done at the bedside on 3/26/2023  · CT of the right upper extremity ordered to evaluate for deeper infection and currently pending  · Wound culture and body fluid culture growing hemolytic Streptococcus  · Leukocytosis is improving  Patient remains afebrile  · Patient was switched from vancomycin to Ancef 2 g every 8 hours yesterday  Plan  Ancef 2 g has been changed from every 8 hours to every 6 hours    Monitor vital signs

## 2023-03-27 NOTE — PROGRESS NOTES
Progress Note - Orthopedics   Dipak Chilel 41 y o  male MRN: [id number]  Unit/Bed#: AN CT SCAN      Subjective:    41 y o male admitted with right elbow cellulitis and septic bursitis s/p bedside I&D yesterday  Today reports that his elbow pain feels somewhat better  Down to 5/10 from 7/10  Still febrile overnight, but fever curve lower  Notes that the redness on his arm has persisted, as well as the swelling and warmth  However overall feels better today       Labs:  0   Lab Value Date/Time    HCT 40 6 03/27/2023 0946    HCT 41 1 03/26/2023 1417    HCT 45 2 03/25/2023 0431    HGB 14 2 03/27/2023 0946    HGB 14 0 03/26/2023 1417    HGB 15 4 03/25/2023 0431    WBC 18 78 (H) 03/27/2023 0946    WBC 24 07 (H) 03/26/2023 1417    WBC 22 32 (H) 03/25/2023 0431    ESR 24 (H) 03/25/2023 0006       Meds:    Current Facility-Administered Medications:   •  acetaminophen (TYLENOL) tablet 650 mg, 650 mg, Oral, Q6H PRN, ALVINA Powell, 650 mg at 03/27/23 1055  •  ceFAZolin (ANCEF) IVPB (premix in dextrose) 2,000 mg 50 mL, 2,000 mg, Intravenous, Q8H, Khushi Gayle MD, Last Rate: 100 mL/hr at 03/27/23 0723, 2,000 mg at 03/27/23 6710  •  loperamide (IMODIUM) capsule 2 mg, 2 mg, Oral, Q4H PRN, Shraddha Mosquera MD  •  morphine injection 2 mg, 2 mg, Intravenous, Once, Edgardo Young DO  •  nicotine (NICODERM CQ) 21 mg/24 hr TD 24 hr patch 1 patch, 1 patch, Transdermal, Daily, ALVINA Powell  •  [facility]etron [facility]) injection 4 mg, 4 mg, Intravenous, Q6H PRN, Ruiz Valdez PA-C, 4 mg at 03/27/23 1055  •  saccharomyces boulardii (FLORASTOR) capsule 250 mg, 250 mg, Oral, BID, Shraddha Mosquera MD, 250 mg at 03/27/23 1048    Blood Culture:   Lab Results   Component Value Date    BLOODCX No Growth at 48 hrs  03/25/2023    BLOODCX No Growth at 48 hrs  03/25/2023       Wound Culture:   Lab Results   Component Value Date    WOUNDCULT Growth in Broth culture only 06/19/2021       Ins and Outs:  No intake/output data recorded  Physical:  Vitals:    03/27/23 0712   BP: 123/67   Pulse: 93   Resp: 18   Temp: 98 9 °F (37 2 °C)   SpO2: 94%     Musculoskeletal: right Upper Extremity  · Dressings taken down  There is diffuse erythema over the arm that has spread out of outlined margins from yesterday  Please see wound images in chart for full characterization  · He has warmth and swelling in the arm, down to and including the hand  · Packing removed from elbow, with some minimal purulence noted  Wound re-packed with 1/4 inch iodoform packing  Patient tolerated without issue  · PROM/AROM 0-150 degrees, with some pain, but no micromotion tenderness noted at the elbow  · He is able to supinate/pronate without issue  · Sensation intact to median/radial/ulnar nerve distribution   · Motor intact anterior interosseous nerve/posterior interosseous nerve/median/radial/ulnar nerve distributions  · 2+ radial pulse, symmetric bilaterally  · Digits warm and well perfused  · Capillary refill < 2 seconds    I have personally reviewed imaging studies  CT right elbow: IMPRESSION:  Subcutaneous edema and skin ulceration at the olecranon process consistent with cellulitis with evidence of recent intervention including soft tissue emphysema and packing material   No evidence of residual abscess collection  No underlying osseous destructive change to indicate osteomyelitis  No deep soft tissue involvement  There is no significant elbow joint effusion to raise suspicion for septic arthropathy at this time  Assessment:    41 y o male with right elbow/arm cellulitis and right elbow septic bursitis, s/p bedside I&D 3/26/2023  Reports improvement in pain today, WBC trending down, fever curve improving  Prelim GC culture negative  Body fluid/gram stain: 3+ beta hemolytic strep A  Plan:  · WBAT to the right upper extremity  · Elevate extremity     · S/p bedside I&D 3/26/2023, follow up final wound culture/gram stain, AFB, GC culture, pending  · ABX per primary team    · Dressing to remain in place, packing replaced today  Will re-evaluate wound tomorrow  · Analgesics per primary team    · Dispo: ortho will follow     · Medical management per primary team     Codey Collins PA-C

## 2023-03-27 NOTE — PROGRESS NOTES
[facility]  Progress Note  Name: Juanpablo Devlin  MRN: [id number]  Unit/Bed#: S -11 I Date of Admission: 3/24/2023   Date of Service: 3/27/2023 I Hospital Day: 2    Assessment/Plan   Sepsis [facility])  Assessment & Plan  · POA, evidenced by leukocytosis, tachycardia and evidence of right elbow bursitis  · Worsening leukocytosis today with intermittent fever, Tmax 102 7 °F and tachycardia up to 101  · Blood cultures negative x24 hours  · Initial wound cultures growing gram-positive cocci in pairs  · Had I&D done at the bedside by orthopedic surgery today   See plan above for bursitis    Nicotine dependence  Assessment & Plan  · Continue NicoDerm patch  · Smoking cessation counseling    * Septic bursitis of elbow, right  Assessment & Plan  · POA, presented with sudden onset right elbow erythema, pain, swelling, rigors  · Met sepsis criteria on admission as documented below  · Status post aspiration of bursa in the ED  Synovial fluid: Cloudy, WBC 21,000, GPC in pairs  · Mildly elevated sed rate and CRP  · Initially placed on high-dose cefazolin 2 g q 8 hours  · 3/26: Patient with worsening swelling, erythema, leukocytosis and persistent fever this morning  · Orthopedic surgery consulted for I&D which was done at the bedside on 3/26/2023  · CT of the right upper extremity ordered to evaluate for deeper infection and currently pending  · Wound culture and body fluid culture growing hemolytic Streptococcus  · Leukocytosis is improving  Patient remains afebrile  · Patient was switched from vancomycin to Ancef 2 g every 8 hours yesterday  Plan  Ancef 2 g has been changed from every 8 hours to every 6 hours  Monitor vital signs         VTE Pharmacologic Prophylaxis: VTE Score: 2 Low Risk (Score 0-2) - Encourage Ambulation  Patient Centered Rounds: I performed bedside rounds with nursing staff today    Discussions with Specialists or Other Care Team Provider: None    Education and Discussions with Family / Patient: Updated  (mother) via phone  Current Length of Stay: 2 day(s)  Current Patient Status: Inpatient   Discharge Plan: Anticipate discharge in 24-48 hrs to home  Code Status: Level 1 - Full Code    Subjective:   Statin Saturday he has been experiencing diarrhea  He states that the diarrhea is associated with nausea  He denies any vomiting  He states that the nausea is controlled with Zofran  Objective:     Vitals:   Temp (24hrs), Av 4 °F (37 4 °C), Min:98 3 °F (36 8 °C), Max:100 5 °F (38 1 °C)    Temp:  [98 3 °F (36 8 °C)-100 5 °F (38 1 °C)] 98 3 °F (36 8 °C)  HR:  [] 91  Resp:  [17-18] 18  BP: (113-123)/(65-70) 115/70  SpO2:  [94 %-99 %] 99 %  Body mass index is 24 78 kg/m²  Input and Output Summary (last 24 hours):   No intake or output data in the 24 hours ending 23 1626    Physical Exam:   Physical Exam  Vitals and nursing note reviewed  Constitutional:       General: He is not in acute distress  Appearance: He is well-developed  HENT:      Head: Normocephalic and atraumatic  Eyes:      Conjunctiva/sclera: Conjunctivae normal    Cardiovascular:      Rate and Rhythm: Normal rate and regular rhythm  Heart sounds: No murmur heard  Pulmonary:      Effort: Pulmonary effort is normal  No respiratory distress  Breath sounds: Normal breath sounds  Abdominal:      Palpations: Abdomen is soft  Tenderness: There is no abdominal tenderness  Musculoskeletal:         General: No swelling  Normal range of motion  Right upper arm: Swelling, edema and tenderness present  Cervical back: Neck supple  Skin:     General: Skin is warm and dry  Capillary Refill: Capillary refill takes less than 2 seconds  Neurological:      General: No focal deficit present  Mental Status: He is alert     Psychiatric:         Mood and Affect: Mood normal          Behavior: Behavior normal           Additional Data: Labs:  Results from last 7 days   Lab Units 03/27/23  0946 03/26/23  1417 03/25/23  0431   WBC Thousand/uL 18 78*   < > 22 32*   HEMOGLOBIN g/dL 14 2   < > 15 4   HEMATOCRIT % 40 6   < > 45 2   PLATELETS Thousands/uL 226   < > 274   BANDS PCT % 12*   < >  --    NEUTROS PCT %  --   --  89*   LYMPHS PCT %  --   --  2*   LYMPHO PCT % 8*   < >  --    MONOS PCT %  --   --  6   MONO PCT % 1*   < >  --    EOS PCT % 5   < > 0    < > = values in this interval not displayed  Results from last 7 days   Lab Units 03/27/23  0946   SODIUM mmol/L 136   POTASSIUM mmol/L 3 0*   CHLORIDE mmol/L 104   CO2 mmol/L 24   BUN mg/dL 10   CREATININE mg/dL 0 93   ANION GAP mmol/L 8   CALCIUM mg/dL 8 7   GLUCOSE RANDOM mg/dL 116                 Results from last 7 days   Lab Units 03/25/23  0006   LACTIC ACID mmol/L 1 7       Lines/Drains:  Invasive Devices     Peripheral Intravenous Line  Duration           Peripheral IV 03/26/23 Left;Ventral (anterior) Forearm 1 day                      Imaging: Reviewed radiology reports from this admission including: CT upper extremity    Recent Cultures (last 7 days):   Results from last 7 days   Lab Units 03/26/23  1027 03/25/23  0029 03/25/23  0006   BLOOD CULTURE   --   --  No Growth at 48 hrs  No Growth at 48 hrs     GRAM STAIN RESULT   --  Rare Polys*  2+ Gram positive cocci in pairs*  --    WOUND CULTURE  Few Colonies of Beta Hemolytic Streptococcus Group A*  --   --    BODY FLUID CULTURE, STERILE   --  3+ Growth of Beta Hemolytic Streptococcus Group A*  --        Last 24 Hours Medication List:   Current Facility-Administered Medications   Medication Dose Route Frequency Provider Last Rate   • acetaminophen  650 mg Oral Q6H PRN ALVINA Cardoso     • cefazolin  2,000 mg Intravenous Q6H Parker Boyce MD 2,000 mg (03/27/23 1440)   • loperamide  2 mg Oral Q4H PRN Parker Boyce MD     • morphine injection  2 mg Intravenous Once Romaine Escalona DO     • nicotine  1 patch Transdermal Daily ALVINA Reddy     • ondansetron  4 mg Intravenous Q6H PRN Ag Grullon PA-C     • saccharomyces boulardii  250 mg Oral BID Pj Austin MD          Today, Patient Was Seen By: Pj Austin MD    **Please Note: This note may have been constructed using a voice recognition system  **

## 2023-03-28 LAB
ANION GAP SERPL CALCULATED.3IONS-SCNC: 6 MMOL/L (ref 4–13)
BACTERIA WND AEROBE CULT: ABNORMAL
BASOPHILS # BLD MANUAL: 0 THOUSAND/UL (ref 0–0.1)
BASOPHILS NFR MAR MANUAL: 0 % (ref 0–1)
BUN SERPL-MCNC: 7 MG/DL (ref 5–25)
CALCIUM SERPL-MCNC: 8.5 MG/DL (ref 8.4–10.2)
CHLORIDE SERPL-SCNC: 105 MMOL/L (ref 96–108)
CO2 SERPL-SCNC: 28 MMOL/L (ref 21–32)
CREAT SERPL-MCNC: 0.86 MG/DL (ref 0.6–1.3)
EOSINOPHIL # BLD MANUAL: 0.84 THOUSAND/UL (ref 0–0.4)
EOSINOPHIL NFR BLD MANUAL: 6 % (ref 0–6)
ERYTHROCYTE [DISTWIDTH] IN BLOOD BY AUTOMATED COUNT: 13 % (ref 11.6–15.1)
GFR SERPL CREATININE-BSD FRML MDRD: 111 ML/MIN/1.73SQ M
GLUCOSE SERPL-MCNC: 117 MG/DL (ref 65–140)
GRAM STN SPEC: ABNORMAL
GRAM STN SPEC: ABNORMAL
HCT VFR BLD AUTO: 37.5 % (ref 36.5–49.3)
HGB BLD-MCNC: 12.7 G/DL (ref 12–17)
LYMPHOCYTES # BLD AUTO: 1.96 THOUSAND/UL (ref 0.6–4.47)
LYMPHOCYTES # BLD AUTO: 14 % (ref 14–44)
MCH RBC QN AUTO: 29.8 PG (ref 26.8–34.3)
MCHC RBC AUTO-ENTMCNC: 33.9 G/DL (ref 31.4–37.4)
MCV RBC AUTO: 88 FL (ref 82–98)
MONOCYTES # BLD AUTO: 0.56 THOUSAND/UL (ref 0–1.22)
MONOCYTES NFR BLD: 4 % (ref 4–12)
NEUTROPHILS # BLD MANUAL: 10.64 THOUSAND/UL (ref 1.85–7.62)
NEUTS SEG NFR BLD AUTO: 76 % (ref 43–75)
PLATELET # BLD AUTO: 231 THOUSANDS/UL (ref 149–390)
PLATELET BLD QL SMEAR: ADEQUATE
PMV BLD AUTO: 9.3 FL (ref 8.9–12.7)
POTASSIUM SERPL-SCNC: 3.3 MMOL/L (ref 3.5–5.3)
RBC # BLD AUTO: 4.26 MILLION/UL (ref 3.88–5.62)
RBC MORPH BLD: NORMAL
SODIUM SERPL-SCNC: 139 MMOL/L (ref 135–147)
WBC # BLD AUTO: 14 THOUSAND/UL (ref 4.31–10.16)

## 2023-03-28 RX ORDER — CLINDAMYCIN PHOSPHATE 600 MG/50ML
600 INJECTION INTRAVENOUS EVERY 8 HOURS
Status: COMPLETED | OUTPATIENT
Start: 2023-03-28 | End: 2023-03-30

## 2023-03-28 RX ORDER — OXYCODONE HYDROCHLORIDE AND ACETAMINOPHEN 5; 325 MG/1; MG/1
1 TABLET ORAL EVERY 4 HOURS PRN
Status: DISCONTINUED | OUTPATIENT
Start: 2023-03-28 | End: 2023-03-31 | Stop reason: HOSPADM

## 2023-03-28 RX ORDER — POTASSIUM CHLORIDE 20 MEQ/1
40 TABLET, EXTENDED RELEASE ORAL 2 TIMES DAILY
Status: COMPLETED | OUTPATIENT
Start: 2023-03-28 | End: 2023-03-28

## 2023-03-28 RX ORDER — KETOROLAC TROMETHAMINE 30 MG/ML
15 INJECTION, SOLUTION INTRAMUSCULAR; INTRAVENOUS ONCE
Status: COMPLETED | OUTPATIENT
Start: 2023-03-28 | End: 2023-03-28

## 2023-03-28 RX ADMIN — KETOROLAC TROMETHAMINE 15 MG: 30 INJECTION, SOLUTION INTRAMUSCULAR at 08:40

## 2023-03-28 RX ADMIN — SODIUM CHLORIDE 4 MILLION UNITS: 9 INJECTION, SOLUTION INTRAVENOUS at 20:19

## 2023-03-28 RX ADMIN — POTASSIUM CHLORIDE 40 MEQ: 1500 TABLET, EXTENDED RELEASE ORAL at 17:52

## 2023-03-28 RX ADMIN — CEFAZOLIN SODIUM 2000 MG: 2 SOLUTION INTRAVENOUS at 08:41

## 2023-03-28 RX ADMIN — ACETAMINOPHEN 650 MG: 325 TABLET ORAL at 01:02

## 2023-03-28 RX ADMIN — POTASSIUM CHLORIDE 40 MEQ: 1500 TABLET, EXTENDED RELEASE ORAL at 08:40

## 2023-03-28 RX ADMIN — Medication 250 MG: at 17:52

## 2023-03-28 RX ADMIN — Medication 250 MG: at 08:40

## 2023-03-28 RX ADMIN — OXYCODONE HYDROCHLORIDE AND ACETAMINOPHEN 1 TABLET: 5; 325 TABLET ORAL at 21:56

## 2023-03-28 RX ADMIN — OXYCODONE HYDROCHLORIDE AND ACETAMINOPHEN 1 TABLET: 5; 325 TABLET ORAL at 17:52

## 2023-03-28 RX ADMIN — SODIUM CHLORIDE 4 MILLION UNITS: 9 INJECTION, SOLUTION INTRAVENOUS at 23:54

## 2023-03-28 RX ADMIN — CEFAZOLIN SODIUM 2000 MG: 2 SOLUTION INTRAVENOUS at 14:11

## 2023-03-28 RX ADMIN — CLINDAMYCIN PHOSPHATE 600 MG: 600 INJECTION, SOLUTION INTRAVENOUS at 19:46

## 2023-03-28 RX ADMIN — CEFAZOLIN SODIUM 2000 MG: 2 SOLUTION INTRAVENOUS at 02:29

## 2023-03-28 NOTE — ASSESSMENT & PLAN NOTE
· POA, evidenced by leukocytosis, tachycardia and evidence of right elbow bursitis  · Worsening leukocytosis today with intermittent fever, Tmax 102 7 °F and tachycardia up to 101  · Blood cultures negative x24 hours  · Initial wound cultures growing gram-positive cocci in pairs  · Had I&D done at the bedside by orthopedic surgery 3/26  See plan above for bursitis

## 2023-03-28 NOTE — PROGRESS NOTES
Progress Note - Orthopedics   Damián Longo 41 y o  male MRN: [id number]  Unit/Bed#: AN CT SCAN      Subjective:    41 y o male admitted with right elbow cellulitis and septic bursitis s/p bedside I&D 3/26  Today he notes continued improvement in elbow pain  He notes no further fevers overnight  He is disappointed to tell me that the antibiotics he requires only comes in IV form, and that he will have to remain admitted for this  However, he is pleased that he feels better overall       Labs:  0   Lab Value Date/Time    HCT 37 5 03/28/2023 0606    HCT 40 6 03/27/2023 0946    HCT 41 1 03/26/2023 1417    HGB 12 7 03/28/2023 0606    HGB 14 2 03/27/2023 0946    HGB 14 0 03/26/2023 1417    WBC 14 00 (H) 03/28/2023 0606    WBC 18 78 (H) 03/27/2023 0946    WBC 24 07 (H) 03/26/2023 1417    ESR 24 (H) 03/25/2023 0006       Meds:    Current Facility-Administered Medications:   •  acetaminophen (TYLENOL) tablet 650 mg, 650 mg, Oral, Q6H PRN, ALVINA Russell, 650 mg at 03/28/23 0102  •  ceFAZolin (ANCEF) IVPB (premix in dextrose) 2,000 mg 50 mL, 2,000 mg, Intravenous, Q6H, Theresa Watson MD, Last Rate: 100 mL/hr at 03/28/23 0841, 2,000 mg at 03/28/23 0841  •  loperamide (IMODIUM) capsule 2 mg, 2 mg, Oral, Q4H PRN, Theresa Watson MD  •  morphine injection 2 mg, 2 mg, Intravenous, Once, Get Lawson DO  •  nicotine (NICODERM CQ) 21 mg/24 hr TD 24 hr patch 1 patch, 1 patch, Transdermal, Daily, ALVINA Russell  •  ondansetron [facility]) injection 4 mg, 4 mg, Intravenous, Q6H PRN, Savanah Barrett PA-C, 4 mg at 03/27/23 1055  •  potassium chloride (K-DUR,KLOR-CON) CR tablet 40 mEq, 40 mEq, Oral, BID, Theresa Boys, MD, 40 mEq at 03/28/23 0840  •  saccharomyces boulardii (FLORASTOR) capsule 250 mg, 250 mg, Oral, BID, Theresa Watson MD, 250 mg at 03/28/23 0840    Blood Culture:   Lab Results   Component Value Date    BLOODCX No Growth at 72 hrs  03/25/2023    BLOODCX No Growth at 72 hrs  03/25/2023       Wound Culture:   Lab Results   Component Value Date    WOUNDCULT (A) 03/26/2023     Few Colonies of Beta Hemolytic Streptococcus Group A       Ins and Outs:  I/O last 24 hours: In: 240 [P O :240]  Out: -           Physical:  Vitals:    03/28/23 0609   BP: 107/65   Pulse: 86   Resp: 18   Temp: 98 3 °F (36 8 °C)   SpO2: 97%     Musculoskeletal: right Upper Extremity  · Dressings taken down  There is diffuse erythema over the arm that has spread out of outlined margins, appears slightly worse today  Please see wound images in chart for full characterization  · He has warmth and swelling in the arm, down to and including the hand  · Packing removed from elbow, with some purulence noted  Wound re-packed with 1/4 inch iodoform packing  Patient tolerated without issue  · PROM/AROM 0-150 degrees, with some pain, but no micromotion tenderness noted at the elbow  · He is able to supinate/pronate without issue  · Sensation intact to median/radial/ulnar nerve distribution   · Motor intact anterior interosseous nerve/posterior interosseous nerve/median/radial/ulnar nerve distributions  · 2+ radial pulse, symmetric bilaterally  · Digits warm and well perfused  · Capillary refill < 2 seconds    Assessment:    38 y o male with right elbow/arm cellulitis and right elbow septic bursitis, s/p bedside I&D 3/26/2023  Reports improvement in pain today, WBC trending down, fever curve improving  Prelim GC culture negative  AFB prelim negative  Body fluid/gram stain: 3+ beta hemolytic strep A  Plan:  · WBAT to the right upper extremity  · Elevate extremity  · S/p bedside I&D 3/26/2023, follow up final wound culture/gram stain, AFB, GC culture, pending  · ABX per primary team    · Dressing to remain in place, packing replaced today  Will re-evaluate wound tomorrow  · Analgesics per primary team    · Dispo: ortho will follow     · Medical management per primary team     Piotr Ibanez Rettaliata, PA-C

## 2023-03-28 NOTE — PROGRESS NOTES
[facility]  Progress Note  Name: Michael Rios  MRN: [id number]  Unit/Bed#: S -28 I Date of Admission: 3/24/2023   Date of Service: 3/28/2023 I Hospital Day: 3    Assessment/Plan   * Septic bursitis of elbow, right  Assessment & Plan  · POA, presented with sudden onset right elbow erythema, pain, swelling, rigors  · Met sepsis criteria on admission as documented below  · Status post aspiration of bursa in the ED  Synovial fluid: Cloudy, WBC 21,000, GPC in pairs  · Mildly elevated sed rate and CRP  · Initially placed on high-dose cefazolin 2 g q 8 hours  · 3/26: Patient with worsening swelling, erythema, leukocytosis and persistent fever this morning  · Orthopedic surgery consulted for I&D which was done at the bedside on 3/26/2023  · CT of the right upper extremity ordered to evaluate for deeper infection and currently pending  · Wound culture and body fluid culture growing hemolytic Streptococcus  · Leukocytosis is improving  Patient remains afebrile  · Patient was switched from vancomycin to Ancef 2 g every 8 hours yesterday  Plan  Due to the  extent of patient's cellulitis, ID consult has been placed  Reviewed input  Continue Ancef 2g Q8H  Monitor vital signs  Serial exam  Neurovascular check    Sepsis [facility])  Assessment & Plan  · POA, evidenced by leukocytosis, tachycardia and evidence of right elbow bursitis  · Worsening leukocytosis today with intermittent fever, Tmax 102 7 °F and tachycardia up to 101  · Blood cultures negative x24 hours  · Initial wound cultures growing gram-positive cocci in pairs  · Had I&D done at the bedside by orthopedic surgery 3/26  See plan above for bursitis    Nicotine dependence  Assessment & Plan  · Continue NicoDerm patch  · Smoking cessation counseling           VTE Pharmacologic Prophylaxis: VTE Score: 2 Low Risk (Score 0-2) - Encourage Ambulation      Patient Centered Rounds: I performed bedside rounds with nursing staff today   Discussions with Specialists or Other Care Team Provider: Orthopedics    Education and Discussions with Family / Patient: Patient declined call to   Current Length of Stay: 3 day(s)  Current Patient Status: Inpatient   Discharge Plan: Anticipate discharge in 48 hrs to home  Code Status: Level 1 - Full Code    Subjective:   Patient complains of pain in his right arm  He says he was unable to sleep throughout the night due to the pain  Patient states that Ketorolac is effective in controlling his pain  He states that he does not want to take any narcotics  Objective:     Vitals:   Temp (24hrs), Av 7 °F (37 6 °C), Min:98 3 °F (36 8 °C), Max:102 °F (38 9 °C)    Temp:  [98 3 °F (36 8 °C)-102 °F (38 9 °C)] 98 3 °F (36 8 °C)  HR:  [81-94] 86  Resp:  [18] 18  BP: (106-115)/(63-70) 107/65  SpO2:  [95 %-99 %] 97 %  Body mass index is 24 78 kg/m²  Input and Output Summary (last 24 hours): Intake/Output Summary (Last 24 hours) at 3/28/2023 1501  Last data filed at 3/28/2023 1315  Gross per 24 hour   Intake 240 ml   Output --   Net 240 ml       Physical Exam:   Physical Exam  Vitals and nursing note reviewed  Constitutional:       General: He is not in acute distress  Appearance: He is well-developed  HENT:      Head: Normocephalic and atraumatic  Eyes:      Conjunctiva/sclera: Conjunctivae normal    Cardiovascular:      Rate and Rhythm: Normal rate and regular rhythm  Heart sounds: No murmur heard  Pulmonary:      Effort: Pulmonary effort is normal  No respiratory distress  Breath sounds: Normal breath sounds  Abdominal:      Palpations: Abdomen is soft  Tenderness: There is no abdominal tenderness  Musculoskeletal:         General: Swelling and tenderness present  Right upper arm: Swelling, edema and tenderness present  Left upper arm: Normal       Right forearm: Swelling, edema and tenderness present        Left forearm: Normal       Right hand: Swelling and tenderness present  Normal range of motion  Normal strength  Normal sensation  Normal pulse  Left hand: Normal       Cervical back: Neck supple  Skin:     General: Skin is warm and dry  Capillary Refill: Capillary refill takes less than 2 seconds  Neurological:      Mental Status: He is alert  Psychiatric:         Mood and Affect: Mood normal           Additional Data:     Labs:  Results from last 7 days   Lab Units 03/28/23  0606 03/27/23  0946 03/26/23  1417 03/25/23  0431   WBC Thousand/uL 14 00* 18 78*   < > 22 32*   HEMOGLOBIN g/dL 12 7 14 2   < > 15 4   HEMATOCRIT % 37 5 40 6   < > 45 2   PLATELETS Thousands/uL 231 226   < > 274   BANDS PCT %  --  12*   < >  --    NEUTROS PCT %  --   --   --  89*   LYMPHS PCT %  --   --   --  2*   LYMPHO PCT % 14 8*   < >  --    MONOS PCT %  --   --   --  6   MONO PCT % 4 1*   < >  --    EOS PCT % 6 5   < > 0    < > = values in this interval not displayed  Results from last 7 days   Lab Units 03/28/23  0606   SODIUM mmol/L 139   POTASSIUM mmol/L 3 3*   CHLORIDE mmol/L 105   CO2 mmol/L 28   BUN mg/dL 7   CREATININE mg/dL 0 86   ANION GAP mmol/L 6   CALCIUM mg/dL 8 5   GLUCOSE RANDOM mg/dL 117                 Results from last 7 days   Lab Units 03/25/23  0006   LACTIC ACID mmol/L 1 7       Lines/Drains:  Invasive Devices     Peripheral Intravenous Line  Duration           Peripheral IV 03/26/23 Left;Ventral (anterior) Forearm 2 days                      Imaging: Reviewed radiology reports from this admission including: CT of upper extremity    Recent Cultures (last 7 days):   Results from last 7 days   Lab Units 03/26/23  1027 03/25/23  0029 03/25/23  0006   BLOOD CULTURE   --   --  No Growth at 72 hrs  No Growth at 72 hrs     GRAM STAIN RESULT   --  Rare Polys*  2+ Gram positive cocci in pairs*  --    WOUND CULTURE  Few Colonies of Beta Hemolytic Streptococcus Group A*  --   --    BODY FLUID CULTURE, STERILE   --  3+ Growth of Beta Hemolytic Streptococcus Group A*  --        Last 24 Hours Medication List:   Current Facility-Administered Medications   Medication Dose Route Frequency Provider Last Rate   • acetaminophen  650 mg Oral Q6H PRN ALVINA Huang     • cefazolin  2,000 mg Intravenous Q6H Nina Gunn MD 2,000 mg (03/28/23 1411)   • loperamide  2 mg Oral Q4H PRN Nina Gunn MD     • morphine injection  2 mg Intravenous Once Deven Herrera DO     • nicotine  1 patch Transdermal Daily ALVINA Huang     • ondansetron  4 mg Intravenous Q6H PRN Ana Laura Flores PA-C     • potassium chloride  40 mEq Oral BID Nina Gunn MD     • saccharomyces boulardii  250 mg Oral BID Nina Gunn MD          Today, Patient Was Seen By: Nina Gunn MD    **Please Note: This note may have been constructed using a voice recognition system  **

## 2023-03-28 NOTE — ASSESSMENT & PLAN NOTE
· POA, presented with sudden onset right elbow erythema, pain, swelling, rigors  · Met sepsis criteria on admission as documented below  · Status post aspiration of bursa in the ED  Synovial fluid: Cloudy, WBC 21,000, GPC in pairs  · Mildly elevated sed rate and CRP  · Initially placed on high-dose cefazolin 2 g q 8 hours  · 3/26: Patient with worsening swelling, erythema, leukocytosis and persistent fever this morning  · Orthopedic surgery consulted for I&D which was done at the bedside on 3/26/2023  · CT of the right upper extremity ordered to evaluate for deeper infection and currently pending  · Wound culture and body fluid culture growing hemolytic Streptococcus  · Leukocytosis is improving  Patient remains afebrile  · Patient was switched from vancomycin to Ancef 2 g every 8 hours yesterday  Plan  Due to the  extent of patient's cellulitis, ID consult has been placed  Reviewed input    Continue Ancef 2g Q8H  Monitor vital signs  Serial exam  Neurovascular check

## 2023-03-28 NOTE — CONSULTS
Consultation - Infectious Disease   Doigo Figueroa 41 y o  male MRN: [id number]  Unit/Bed#: S -01 Encounter: [id number]      IMPRESSION & RECOMMENDATIONS:     1  Sepsis, present on admission with fever and leukocytosis  Due to #2 below  Concern for toxin mediated process in the setting of group A strep infection  Blood cultures show no growth  -Antibiotics as below   -Monitor white blood cell count, fever curve    -Follow-up blood cultures   -If patient continues to have fever, repeat blood cultures tomorrow   -Appreciate orthopedic surgery follow-up for source control    2  Right arm cellulitis, olecranon bursitis  Developing after a possible skin abrasion, scab developed on the right elbow  Status post aspiration in the ED and bedside I&D by orthopedic surgery 3/26/2023  Wound culture showed growth of group A strep  Blood cultures show no growth  CT of the right arm obtained yesterday did not show any drainable abscess, osteomyelitis, evidence of septic arthritis  The patient continues to have fevers although fever curve and leukocytosis improving  Cellulitis is ongoing and is actually extended beyond the marker outline with evidence of lymphangitic streaking  Concern for toxin production in the setting of group A strep infection    -Stop IV cefazolin   -Start IV penicillin G 4,000,000 units every 4 hours   -Start clindamycin 600 mg IV every 8 hours for antitoxin effect  Anticipate stopping the next 24 to 48 hours if clinical improvement   -Orthopedic surgery follow-up ongoing   -Continue local wound care   -Monitor clinical exam    3  Continue dependence  Encourage smoking cessation, management per primary    I have discussed the above management plan in detail with the primary service and patient at bedside  ID will follow      I have performed an extensive review of the medical records in Epic including review of the notes, radiographs, and laboratory results     HISTORY OF PRESENT ILLNESS:  Reason for Consult: Cellulitis, olecranon bursitis  HPI: Fide Galan is a 45-year-old man with a history of nicotine dependence who presented to the [address] [place] [address] ED on 3/24/2023 with acute onset right elbow pain, swelling, erythema  He is unsure exactly what precipitated this but he did not note a scab over the elbow which happened when he was playing with his friend's child  He denies any other skin lesions, other joint pain  He has no recent surgical procedures, no injection drug use  The patient has a dog at home but he had no animal bites  On presentation the patient was febrile to 1-2 7 with a white blood count of 15 72  He was started on vancomycin and ceftriaxone  The olecranon bursa was aspirated in the ED with removal of cloudy synovial fluid  Fluid analysis showed 21,000 white blood cells and culture is growing group A strep  Due to continued right elbow pain and swelling and worsening erythema, orthopedic surgery was consulted and bedside I&D of the bursa was performed 3/26/2023  Wound culture is also growing group A strep  Blood cultures show no growth  Antibiotics has been transitioned to high-dose IV cefazolin  CT of the upper extremity performed yesterday shows to be cellulitis but no evidence of abscess, osteomyelitis, septic arthritis  Erythema continues to extend be on the marker outline  ID is consulted for further evaluation  REVIEW OF SYSTEMS:  A complete review of systems is negative other than that noted in the HPI  PAST MEDICAL HISTORY:  History reviewed  No pertinent past medical history  History reviewed  No pertinent surgical history      FAMILY HISTORY:  Non-contributory    SOCIAL HISTORY:  Social History   Social History     Substance and Sexual Activity   Alcohol Use Yes   • Alcohol/week: 6 0 standard drinks   • Types: 6 Cans of beer per week     Social History     Substance and Sexual Activity   Drug Use Yes   • Types: Marijuana     Social History     Tobacco Use   Smoking Status Every Day   • Packs/day: 1 00   • Types: Cigarettes   Smokeless Tobacco Never       ALLERGIES:  No Known Allergies    MEDICATIONS:  All current active medications have been reviewed  PHYSICAL EXAM:  Temp:  [98 3 °F (36 8 °C)-102 °F (38 9 °C)] 98 3 °F (36 8 °C)  HR:  [81-94] 87  Resp:  [16-18] 16  BP: (100-107)/(61-65) 100/61  SpO2:  [95 %-98 %] 98 %  Temp (24hrs), Av °F (37 8 °C), Min:98 3 °F (36 8 °C), Max:102 °F (38 9 °C)  Current: Temperature: 98 3 °F (36 8 °C)    Intake/Output Summary (Last 24 hours) at 3/28/2023 1634  Last data filed at 3/28/2023 2185  Gross per 24 hour   Intake 240 ml   Output --   Net 240 ml       General Appearance:  Appearing well, nontoxic, and in no distress   Head:  Normocephalic, without obvious abnormality, atraumatic   Eyes:  Conjunctiva pink and sclera anicteric, both eyes   Nose: Nares normal, mucosa normal, no drainage   Throat: Oropharynx moist without lesions   Neck: Supple, symmetrical, no adenopathy, no tenderness/mass/nodules   Back:   Symmetric, no curvature, ROM normal, no CVA tenderness   Lungs:   Clear to auscultation bilaterally, respirations unlabored   Chest Wall:  No tenderness or deformity   Heart:  RRR; no murmur, rub or gallop   Abdomen:   Soft, non-tender, non-distended, positive bowel sounds    Extremities: No cyanosis, clubbing or edema   Skin:  Right olecranon bursa swelling, incision with packing in place  Diffuse erythema and swelling extending from the upper arm to the hand, lymphangitic streaking, erythema extending past the marker outline   Lymph nodes: Cervical, supraclavicular nodes normal   Neurologic: Alert and oriented times 3, extremity strength 5/5 and symmetric       LABS, IMAGING, & OTHER STUDIES:  Lab Results:  I have personally reviewed pertinent labs    Results from last 7 days   Lab Units 23  0606 23  0946 23  1417   WBC Thousand/uL 14 00* 18 78* 24 07*   HEMOGLOBIN g/dL 12 7 14 2 14 0   PLATELETS Thousands/uL 231 226 209     Results from last 7 days   Lab Units 03/28/23  0606 03/27/23  0946 03/25/23  0431   SODIUM mmol/L 139 136 137   POTASSIUM mmol/L 3 3* 3 0* 3 8   CHLORIDE mmol/L 105 104 106   CO2 mmol/L 28 24 23   BUN mg/dL 7 10 11   CREATININE mg/dL 0 86 0 93 0 88   EGFR ml/min/1 73sq m 111 105 110   CALCIUM mg/dL 8 5 8 7 9 1     Results from last 7 days   Lab Units 03/26/23  1027 03/25/23  0029 03/25/23  0006   BLOOD CULTURE   --   --  No Growth at 72 hrs  No Growth at 72 hrs  GRAM STAIN RESULT  No polys seen*  1+ Gram positive cocci in pairs* Rare Polys*  2+ Gram positive cocci in pairs*  --    WOUND CULTURE  Few Colonies of Beta Hemolytic Streptococcus Group A*  --   --    BODY FLUID CULTURE, STERILE   --  3+ Growth of Beta Hemolytic Streptococcus Group A*  --                        Imaging Studies:   I have personally reviewed pertinent imaging study reports and images in PACS      CT right upper extremity with contrast 3/27/2023: Cellulitis overlying the olecranon process, no residual abscess collection, no underlying osseous destruction, no joint effusion

## 2023-03-29 LAB
ANION GAP SERPL CALCULATED.3IONS-SCNC: 6 MMOL/L (ref 4–13)
BASOPHILS # BLD AUTO: 0.08 THOUSANDS/ÂΜL (ref 0–0.1)
BASOPHILS NFR BLD AUTO: 1 % (ref 0–1)
BUN SERPL-MCNC: 7 MG/DL (ref 5–25)
CALCIUM SERPL-MCNC: 8.2 MG/DL (ref 8.4–10.2)
CHLORIDE SERPL-SCNC: 105 MMOL/L (ref 96–108)
CO2 SERPL-SCNC: 27 MMOL/L (ref 21–32)
CREAT SERPL-MCNC: 0.75 MG/DL (ref 0.6–1.3)
EOSINOPHIL # BLD AUTO: 0.83 THOUSAND/ÂΜL (ref 0–0.61)
EOSINOPHIL NFR BLD AUTO: 6 % (ref 0–6)
ERYTHROCYTE [DISTWIDTH] IN BLOOD BY AUTOMATED COUNT: 13.2 % (ref 11.6–15.1)
GFR SERPL CREATININE-BSD FRML MDRD: 118 ML/MIN/1.73SQ M
GLUCOSE SERPL-MCNC: 97 MG/DL (ref 65–140)
HCT VFR BLD AUTO: 33.3 % (ref 36.5–49.3)
HGB BLD-MCNC: 11.2 G/DL (ref 12–17)
IMM GRANULOCYTES # BLD AUTO: 0.14 THOUSAND/UL (ref 0–0.2)
IMM GRANULOCYTES NFR BLD AUTO: 1 % (ref 0–2)
LYMPHOCYTES # BLD AUTO: 1.57 THOUSANDS/ÂΜL (ref 0.6–4.47)
LYMPHOCYTES NFR BLD AUTO: 11 % (ref 14–44)
MAGNESIUM SERPL-MCNC: 2 MG/DL (ref 1.9–2.7)
MCH RBC QN AUTO: 29.8 PG (ref 26.8–34.3)
MCHC RBC AUTO-ENTMCNC: 33.6 G/DL (ref 31.4–37.4)
MCV RBC AUTO: 89 FL (ref 82–98)
MONOCYTES # BLD AUTO: 1.14 THOUSAND/ÂΜL (ref 0.17–1.22)
MONOCYTES NFR BLD AUTO: 8 % (ref 4–12)
NEUTROPHILS # BLD AUTO: 10.35 THOUSANDS/ÂΜL (ref 1.85–7.62)
NEUTS SEG NFR BLD AUTO: 73 % (ref 43–75)
NRBC BLD AUTO-RTO: 0 /100 WBCS
PLATELET # BLD AUTO: 259 THOUSANDS/UL (ref 149–390)
PMV BLD AUTO: 9.4 FL (ref 8.9–12.7)
POTASSIUM SERPL-SCNC: 3.3 MMOL/L (ref 3.5–5.3)
RBC # BLD AUTO: 3.76 MILLION/UL (ref 3.88–5.62)
SODIUM SERPL-SCNC: 138 MMOL/L (ref 135–147)
WBC # BLD AUTO: 14.11 THOUSAND/UL (ref 4.31–10.16)

## 2023-03-29 RX ORDER — POTASSIUM CHLORIDE 14.9 MG/ML
20 INJECTION INTRAVENOUS
Status: DISCONTINUED | OUTPATIENT
Start: 2023-03-29 | End: 2023-03-29

## 2023-03-29 RX ORDER — POTASSIUM CHLORIDE 20 MEQ/1
40 TABLET, EXTENDED RELEASE ORAL ONCE
Status: COMPLETED | OUTPATIENT
Start: 2023-03-29 | End: 2023-03-29

## 2023-03-29 RX ORDER — POTASSIUM CHLORIDE 20 MEQ/1
20 TABLET, EXTENDED RELEASE ORAL ONCE
Status: COMPLETED | OUTPATIENT
Start: 2023-03-29 | End: 2023-03-29

## 2023-03-29 RX ADMIN — OXYCODONE HYDROCHLORIDE AND ACETAMINOPHEN 1 TABLET: 5; 325 TABLET ORAL at 20:21

## 2023-03-29 RX ADMIN — Medication 250 MG: at 09:06

## 2023-03-29 RX ADMIN — SODIUM CHLORIDE 4 MILLION UNITS: 9 INJECTION, SOLUTION INTRAVENOUS at 03:46

## 2023-03-29 RX ADMIN — POTASSIUM CHLORIDE 20 MEQ: 14.9 INJECTION, SOLUTION INTRAVENOUS at 10:00

## 2023-03-29 RX ADMIN — CLINDAMYCIN PHOSPHATE 600 MG: 600 INJECTION, SOLUTION INTRAVENOUS at 20:21

## 2023-03-29 RX ADMIN — CLINDAMYCIN PHOSPHATE 600 MG: 600 INJECTION, SOLUTION INTRAVENOUS at 03:00

## 2023-03-29 RX ADMIN — OXYCODONE HYDROCHLORIDE AND ACETAMINOPHEN 1 TABLET: 5; 325 TABLET ORAL at 15:46

## 2023-03-29 RX ADMIN — OXYCODONE HYDROCHLORIDE AND ACETAMINOPHEN 1 TABLET: 5; 325 TABLET ORAL at 07:23

## 2023-03-29 RX ADMIN — POTASSIUM CHLORIDE 40 MEQ: 1500 TABLET, EXTENDED RELEASE ORAL at 09:06

## 2023-03-29 RX ADMIN — SODIUM CHLORIDE 4 MILLION UNITS: 9 INJECTION, SOLUTION INTRAVENOUS at 17:25

## 2023-03-29 RX ADMIN — SODIUM CHLORIDE 4 MILLION UNITS: 9 INJECTION, SOLUTION INTRAVENOUS at 13:07

## 2023-03-29 RX ADMIN — CLINDAMYCIN PHOSPHATE 600 MG: 600 INJECTION, SOLUTION INTRAVENOUS at 13:59

## 2023-03-29 RX ADMIN — OXYCODONE HYDROCHLORIDE AND ACETAMINOPHEN 1 TABLET: 5; 325 TABLET ORAL at 11:34

## 2023-03-29 RX ADMIN — POTASSIUM CHLORIDE 20 MEQ: 1500 TABLET, EXTENDED RELEASE ORAL at 17:31

## 2023-03-29 RX ADMIN — SODIUM CHLORIDE 4 MILLION UNITS: 9 INJECTION, SOLUTION INTRAVENOUS at 08:59

## 2023-03-29 RX ADMIN — SODIUM CHLORIDE 4 MILLION UNITS: 9 INJECTION, SOLUTION INTRAVENOUS at 21:08

## 2023-03-29 RX ADMIN — Medication 250 MG: at 17:26

## 2023-03-29 RX ADMIN — OXYCODONE HYDROCHLORIDE AND ACETAMINOPHEN 1 TABLET: 5; 325 TABLET ORAL at 03:00

## 2023-03-29 NOTE — PLAN OF CARE
Problem: PAIN - ADULT  Goal: Verbalizes/displays adequate comfort level or baseline comfort level  Description: Interventions:  - Encourage patient to monitor pain and request assistance  - Assess pain using appropriate pain scale  - Administer analgesics based on type and severity of pain and evaluate response  - Implement non-pharmacological measures as appropriate and evaluate response  - Consider cultural and social influences on pain and pain management  - Notify physician/advanced practitioner if interventions unsuccessful or patient reports new pain  Outcome: Progressing     Problem: INFECTION - ADULT  Goal: Absence or prevention of progression during hospitalization  Description: INTERVENTIONS:  - Assess and monitor for signs and symptoms of infection  - Monitor lab/diagnostic results  - Monitor all insertion sites, i e  indwelling lines, tubes, and drains  - Monitor endotracheal if appropriate and nasal secretions for changes in amount and color  - [place] appropriate cooling/warming therapies per order  - Administer medications as ordered  - Instruct and encourage patient and family to use good hand hygiene technique  - Identify and instruct in appropriate isolation precautions for identified infection/condition  Outcome: Progressing     Problem: SAFETY ADULT  Goal: Patient will remain free of falls  Description: INTERVENTIONS:  - Educate patient/family on patient safety including physical limitations  - Instruct patient to call for assistance with activity   - Consult OT/PT to assist with strengthening/mobility   - Keep Call bell within reach  - Keep bed low and locked with side rails adjusted as appropriate  - Keep care items and personal belongings within reach  - Initiate and maintain comfort rounds  - Make Fall Risk Sign visible to staff  - Apply yellow socks and bracelet for high fall risk patients  - Consider moving patient to room near nurses station  Outcome: Progressing  Goal: Maintain or return to baseline ADL function  Description: INTERVENTIONS:  -  Assess patient's ability to carry out ADLs; assess patient's baseline for ADL function and identify physical deficits which impact ability to perform ADLs (bathing, care of mouth/teeth, toileting, grooming, dressing, etc )  - Assess/evaluate cause of self-care deficits   - Assess range of motion  - Assess patient's mobility; develop plan if impaired  - Assess patient's need for assistive devices and provide as appropriate  - Encourage maximum independence but intervene and supervise when necessary  - Involve family in performance of ADLs  - Assess for home care needs following discharge   - Consider OT consult to assist with ADL evaluation and planning for discharge  - Provide patient education as appropriate  Outcome: Progressing  Goal: Maintains/Returns to pre admission functional level  Description: INTERVENTIONS:  - Perform BMAT or MOVE assessment daily    - Set and communicate daily mobility goal to care team and patient/family/caregiver     - Collaborate with rehabilitation services on mobility goals if consulted  - Out of bed for toileting  - Record patient progress and toleration of activity level   Outcome: Progressing     Problem: DISCHARGE PLANNING  Goal: Discharge to home or other facility with appropriate resources  Description: INTERVENTIONS:  - Identify barriers to discharge w/patient and caregiver  - Arrange for needed discharge resources and transportation as appropriate  - Identify discharge learning needs (meds, wound care, etc )  - Arrange for interpretive services to assist at discharge as needed  - Refer to Case Management Department for coordinating discharge planning if the patient needs post-hospital services based on physician/advanced practitioner order or complex needs related to functional status, cognitive ability, or social support system  Outcome: Progressing     Problem: Knowledge Deficit  Goal: Patient/family/caregiver demonstrates understanding of disease process, treatment plan, medications, and discharge instructions  Description: Complete learning assessment and assess knowledge base    Interventions:  - Provide teaching at level of understanding  - Provide teaching via preferred learning methods  Outcome: Progressing

## 2023-03-29 NOTE — CASE MANAGEMENT
Case Management Assessment & Discharge Planning Note    Patient name Jose Lerma  Location S /S -01 MRN [id number]  : 1985 Date 3/29/2023       Current Admission Date: 3/24/2023  Current Admission Diagnosis:Septic bursitis of elbow, right   Patient Active Problem List    Diagnosis Date Noted   • Nicotine dependence 2023   • Septic bursitis of elbow, right 2023   • Sepsis ([address] ) 2023      LOS (days): 4  Geometric Mean LOS (GMLOS) (days): 3 50  Days to GMLOS:-0 9     OBJECTIVE:    Risk of Unplanned Readmission Score: 7 66      Current admission status: Inpatient       Preferred Pharmacy:   [facility] [address], [address] [place] [address]  [facility]  Phone: [phone] Fax: [phone]    Primary Care Provider: Kasey Singh DO    Primary Insurance: [employer] PENDING  Secondary Insurance:     ASSESSMENT:  [facility], [address] [place] [address] 83 Representative - Mother, Legal Guardian   Primary Phone: [phone] (Mobile)               Advance Directives  Does patient have a [facility]?: Yes  Does patient have Advance Directives?: No  Primary Contact: Patient's mother Huey    Readmission Root Cause  30 Day Readmission: No    Patient Information  Admitted from[de-identified] Home  Mental Status: Alert  During Assessment patient was accompanied by: Not accompanied during assessment  Assessment information provided by[de-identified] Patient  Primary Caregiver: Self  Support Systems: Self, Family members  [place] of Residence: [address] [place] [address] do you live in?: [facility] entry access options   Select all that apply : Stairs  Number of steps to enter home : One Flight  Do the steps have railings?: Yes  Type of Current Residence: [address]  In the last 12 months, was there a time when you were not able to pay the mortgage or rent on time?: No  In the last 12 months, was there a time when you did not have a steady place to sleep or slept in a shelter (including now)?: No  Homeless/housing insecurity resource given?: No    Activities of Daily Living Prior to Admission  Functional Status: Independent  Completes ADLs independently?: Yes  Ambulates independently?: Yes  Does patient use assisted devices?: No  Does patient currently own DME?: No  Does patient have a history of Outpatient Therapy (PT/OT)?: No  Does the patient have a history of Short-Term Rehab?: No  Does patient have a history of HHC?: No  Does patient currently have [facility]?: No    Patient Information Continued  Income Source: Employed  Does patient have prescription coverage?: Yes  Food insecurity resource given?: No  Does patient receive dialysis treatments?: No  Does patient have a history of substance abuse?: No  Does patient have a history of Mental Health Diagnosis?: No    PHQ 2/9 Screening   Reviewed PHQ 2/9 Depression Screening Score?: No    Means of Transportation  Means of Transport to Appts[de-identified] Drives Self  In the past 12 months, has lack of transportation kept you from medical appointments or from getting medications?: No  In the past 12 months, has lack of transportation kept you from meetings, work, or from getting things needed for daily living?: No  Was application for public transport provided?: No    DISCHARGE DETAILS:    Discharge planning discussed with[de-identified] patient  Freedom of Choice: Yes     CM contacted family/caregiver?: No- see comments (patient updating family)  Were Treatment Team discharge recommendations reviewed with patient/caregiver?: Yes  Did patient/caregiver verbalize understanding of patient care needs?: Yes  Were patient/caregiver advised of the risks associated with not following Treatment Team discharge recommendations?: Yes    [facility]         Is the patient interested in [facility] at discharge?: No    DME Referral Provided  Referral made for DME?: No    Other Referral/Resources/Interventions Provided:  Referral Comments: No insurance, KAILEY following    Treatment Team Recommendation: Home  Discharge Destination Plan[de-identified] Home  Transport at Discharge : Family     CM spoke with patient at the bedside  CM introduced self and role  Patient is independent at baseline  No DME  Works in construction and drives  Patient does not have health insurance  KAILEY spoke with patient at the bedside yesterday and provided all paperwork needed  CM stressed importance to complete paperwork as soon as possible for PATHS to assist with insurance options  Patient expressed understanding at bedside  CM discussed pharmacy preference at discharge  Patient does not have a preference  Patient does not take medications daily  CM discussed [facility] as an option  Patient stated he will think about it  Patient will need a work note at discharge  All questions/concerns answered at this time  CM reviewed discharge planning process including the following: identifying caregivers at home, preference for d/c planning needs,   availability of [facility] Meds to Bed program, availability of treatment team to discuss questions or concerns patient and/or family may have regarding diagnosis, plan of care, old or new medications and discharge planning   CM will continue to follow for care coordination and update assessment as appropriate

## 2023-03-29 NOTE — PROGRESS NOTES
Progress Note - Orthopedics   Luis Cruise 41 y o  male MRN: [id number]  Unit/Bed#: S -01      Subjective:    41 y o male admitted with right elbow cellulitis and septic bursitis s/p bedside I&D 3/26  He reports continued improvement in his pain  He feels that he has less swelling in his upper arm  Notes continued swelling in his hand  Has been elevating extremity as advised  Antibiotics were altered per ID service yesterday       Labs:  0   Lab Value Date/Time    HCT 33 3 (L) 03/29/2023 0519    HCT 37 5 03/28/2023 0606    HCT 40 6 03/27/2023 0946    HGB 11 2 (L) 03/29/2023 0519    HGB 12 7 03/28/2023 0606    HGB 14 2 03/27/2023 0946    WBC 14 11 (H) 03/29/2023 0519    WBC 14 00 (H) 03/28/2023 0606    WBC 18 78 (H) 03/27/2023 0946    ESR 24 (H) 03/25/2023 0006       Meds:    Current Facility-Administered Medications:   •  clindamycin (CLEOCIN) IVPB (premix in dextrose) 600 mg 50 mL, 600 mg, Intravenous, Q8H, Viky Bullard MD, Last Rate: 100 mL/hr at 03/29/23 0300, 600 mg at 03/29/23 0300  •  loperamide (IMODIUM) capsule 2 mg, 2 mg, Oral, Q4H PRN, Nina Gunn MD  •  morphine injection 2 mg, 2 mg, Intravenous, Once, Deven Herrera DO  •  nicotine (NICODERM CQ) 21 mg/24 hr TD 24 hr patch 1 patch, 1 patch, Transdermal, Daily, ALVINA Huang  •  ondansetron [facility]) injection 4 mg, 4 mg, Intravenous, Q6H PRN, Ana Laura Florse PA-C, 4 mg at 03/27/23 1055  •  oxyCODONE-acetaminophen (PERCOCET) 5-325 mg per tablet 1 tablet, 1 tablet, Oral, Q4H PRN, Nina Gunn MD, 1 tablet at 03/29/23 1134  •  penicillin G (PFIZERPEN-G) 4 Million Units in sodium chloride 0 9 % 50 mL IVPB, 4 Million Units, Intravenous, Q4H, Elijah Hardwick MD, Last Rate: 100 mL/hr at 03/29/23 1307, 4 Million Units at 03/29/23 1307  •  saccharomyces boulardii (FLORASTOR) capsule 250 mg, 250 mg, Oral, BID, Nina Gunn MD, 250 mg at 03/29/23 1104    Blood Culture:   Lab Results   Component Value Date    BLOODCX No Growth After 4 Days  03/25/2023    BLOODCX No Growth After 4 Days  03/25/2023       Wound Culture:   Lab Results   Component Value Date    WOUNDCULT (A) 03/26/2023     Few Colonies of Beta Hemolytic Streptococcus Group A       Ins and Outs:  I/O last 24 hours: In: 240 [P O :240]  Out: -           Physical:  Vitals:    03/29/23 0747   BP: 111/67   Pulse: 77   Resp: 19   Temp: 97 7 °F (36 5 °C)   SpO2: 96%     Musculoskeletal: right Upper Extremity  · Dressings taken down  There is diffuse erythema over the arm that has spread out of outlined margins, appears marginally improved today  Please see wound images in chart for full characterization  · He has warmth and swelling in the arm, down to and including the hand  · Packing removed from elbow, with some purulence noted  Wound re-packed with 1/4 inch iodoform packing  Patient tolerated without issue  · PROM/AROM 0-150 degrees, with some pain, but no micromotion tenderness noted at the elbow  · He is able to supinate/pronate without issue  · Sensation intact to median/radial/ulnar nerve distribution   · Motor intact anterior interosseous nerve/posterior interosseous nerve/median/radial/ulnar nerve distributions  · 2+ radial pulse, symmetric bilaterally  · Digits warm and well perfused  · Capillary refill < 2 seconds    Assessment:    38 y o male with right elbow/arm cellulitis and right elbow septic bursitis, s/p bedside I&D 3/26/2023  Reports improvement in pain today, WBC trending down, fever curve improving  Prelim GC culture negative  AFB prelim negative  Body fluid/gram stain: 3+ beta hemolytic strep A  Plan:  · WBAT to the right upper extremity  · Elevate extremity  · S/p bedside I&D 3/26/2023, follow up final wound culture/gram stain, AFB, GC culture, pending  · ABX per primary team/ID  · Dressing to remain in place, packing replaced today  Will re-evaluate wound tomorrow     · Analgesics per primary team    · Dispo: ortho will follow     · Medical management per primary team     Anival Hernández PA-C

## 2023-03-29 NOTE — PROGRESS NOTES
Progress Note - Infectious Disease   Rene Correa 41 y o  male MRN: [id number]  Unit/Bed#: S -01 Encounter: [id number]      Impression/Plan:    1  Sepsis, present on admission with fever and leukocytosis  Due to #2 below  Concern for toxin mediated process in the setting of group A strep infection  Blood cultures show no growth  Fever curve and leukocytosis improving              -Antibiotics as below              -Monitor white blood cell count, fever curve                 -If patient continues to have fever, repeat blood cultures              -Appreciate orthopedic surgery follow-up for source control     2  Right arm cellulitis, olecranon bursitis  Developing after a possible skin abrasion, scab developed on the right elbow  Status post aspiration in the ED and bedside I&D by orthopedic surgery 3/26/2023  Wound culture showed growth of group A strep  Blood cultures show no growth  CT of the right arm did not show any drainable abscess, osteomyelitis, evidence of septic arthritis  Fever curve and leukocytosis improving  Cellulitis is ongoing and had extended beyond the marker outline with evidence of lymphangitic streaking  Concern for toxin production in the setting of group A strep infection, switch to IV penicillin G and clindamycin  The patient continues to have erythema and swelling although this is somewhat improving today               -Continue IV penicillin G 4,000,000 units every 4 hours              -Continue clindamycin 600 mg IV every 8 hours for antitoxin effect, for another 24 hours  Anticipate stopping the next 24 to 48 hours if clinical improvement              -Orthopedic surgery follow-up ongoing              -Continue local wound care              -Monitor clinical exam     3  Continue dependence  Encourage smoking cessation, management per primary     I have discussed the above management plan in detail with the primary service and patient at bedside    ID will follow  Antibiotics:  Penicillin/clindamycin day 2  Antibiotics day 5    Subjective: The patient is feeling a little bit better today, fever curve is improving  He continues to have pain and swelling from the right elbow to the hand, but reports that upper arm swelling is improved today  The erythema of the upper arm is still present past marker outline but appears less intense today  Objective:  Vitals:  Temp:  [97 7 °F (36 5 °C)-99 9 °F (37 7 °C)] 97 8 °F (36 6 °C)  HR:  [77-78] 78  Resp:  [16-19] 17  BP: (111-117)/(67-68) 112/68  SpO2:  [96 %-98 %] 98 %  Temp (24hrs), Av 5 °F (36 9 °C), Min:97 7 °F (36 5 °C), Max:99 9 °F (37 7 °C)  Current: Temperature: 97 8 °F (36 6 °C)    Physical Exam:   General Appearance:  Alert, interactive, nontoxic, no acute distress  Throat: Oropharynx moist without lesions  Lungs:   Clear to auscultation bilaterally; no wheezes, rhonchi or rales; respirations unlabored   Heart:  RRR; no murmur, rub or gallop   Abdomen:   Soft, non-tender, non-distended, positive bowel sounds  Extremities:  Right elbow, forearm, hand swelling   Skin:  Right olecranon bursa swelling, incision with packing in place  Diffuse erythema and swelling extending from the upper arm to the hand, lymphangitic streaking, erythema extending past the marker outline  Labs:    All pertinent labs and imaging studies were personally reviewed  Results from last 7 days   Lab Units 23  0623  0946   WBC Thousand/uL 14 11* 14 00* 18 78*   HEMOGLOBIN g/dL 11 2* 12 7 14 2   PLATELETS Thousands/uL 259 231 226     Results from last 7 days   Lab Units 23  0606 23  0946   SODIUM mmol/L 138 139 136   POTASSIUM mmol/L 3 3* 3 3* 3 0*   CHLORIDE mmol/L 105 105 104   CO2 mmol/L 27 28 24   BUN mg/dL 7 7 10   CREATININE mg/dL 0 75 0 86 0 93   EGFR ml/min/1 73sq m 118 111 105   CALCIUM mg/dL 8 2* 8 5 8 7                       Micro:  Results from last 7 days   Lab Units 03/26/23  1027 03/25/23  0029 03/25/23  0006   BLOOD CULTURE   --   --  No Growth After 4 Days  No Growth After 4 Days     GRAM STAIN RESULT  No polys seen*  1+ Gram positive cocci in pairs* Rare Polys*  2+ Gram positive cocci in pairs*  --    WOUND CULTURE  Few Colonies of Beta Hemolytic Streptococcus Group A*  --   --    BODY FLUID CULTURE, STERILE   --  3+ Growth of Beta Hemolytic Streptococcus Group A*  --        Imaging:  I have personally reviewed pertinent imaging study reports and images in PACS      CT right upper extremity with contrast 3/27/2023: Cellulitis overlying the olecranon process, no residual abscess collection, no underlying osseous destruction, no joint effusion

## 2023-03-29 NOTE — PROGRESS NOTES
[facility]  Progress Note  Name: Los Aldridge  MRN: [id number]  Unit/Bed#: S -22 I Date of Admission: 3/24/2023   Date of Service: 3/29/2023 I Hospital Day: 4    Assessment/Plan   * Septic bursitis of elbow, right  Assessment & Plan  · POA, presented with sudden onset right elbow erythema, pain, swelling, rigors  · Met sepsis criteria on admission as documented below  · Status post aspiration of bursa in the ED  Synovial fluid: Cloudy, WBC 21,000, GPC in pairs  · Mildly elevated sed rate and CRP  · Initially placed on high-dose cefazolin 2 g q 8 hours  · 3/26: Patient with worsening swelling, erythema, leukocytosis and persistent fever this morning  · Orthopedic surgery consulted for I&D which was done at the bedside on 3/26/2023  · CT of the right upper extremity ordered to evaluate for deeper infection and currently pending  · Wound culture and body fluid culture growing hemolytic Streptococcus  · Leukocytosis is improving  Patient remains afebrile  · Patient was switched from vancomycin to Ancef 2 g every 8 hours yesterday  ID switched patient Antibiotic to IV penciling G and Clindamycin yesterday  Plan  Continue Antibiotic as outlined above  Monitor vital signs  Serial exam  Neurovascular check    Sepsis [facility])  Assessment & Plan  · POA, evidenced by leukocytosis, tachycardia and evidence of right elbow bursitis  · Worsening leukocytosis today with intermittent fever, Tmax 102 7 °F and tachycardia up to 101  · Blood cultures negative x24 hours  · Initial wound cultures growing gram-positive cocci in pairs  · Had I&D done at the bedside by orthopedic surgery 3/26  See plan above for bursitis    Nicotine dependence  Assessment & Plan  · Continue NicoDerm patch  · Smoking cessation counseling             VTE Pharmacologic Prophylaxis: VTE Score: 2 Low Risk (Score 0-2) - Encourage Ambulation      Patient Centered Rounds: I performed bedside rounds with nursing staff today   Discussions with Specialists or Other Care Team Provider: none    Education and Discussions with Family / Patient: Patient declined call to   Current Length of Stay: 4 day(s)  Current Patient Status: Inpatient   Discharge Plan: Anticipate discharge in 48 hrs to home  Code Status: Level 1 - Full Code    Subjective:   Patient reports significant improve in his pain  He also states that the diarrhea and nausea has resolved  He is tolerating PO  No new complaints today  Objective:     Vitals:   Temp (24hrs), Av 5 °F (36 9 °C), Min:97 7 °F (36 5 °C), Max:99 9 °F (37 7 °C)    Temp:  [97 7 °F (36 5 °C)-99 9 °F (37 7 °C)] 97 8 °F (36 6 °C)  HR:  [77-78] 78  Resp:  [16-19] 17  BP: (111-117)/(67-68) 112/68  SpO2:  [96 %-98 %] 98 %  Body mass index is 24 78 kg/m²  Input and Output Summary (last 24 hours):   No intake or output data in the 24 hours ending 23 3174    Physical Exam:   Physical Exam  Vitals and nursing note reviewed  Constitutional:       General: He is not in acute distress  Appearance: He is well-developed  HENT:      Head: Normocephalic and atraumatic  Eyes:      Conjunctiva/sclera: Conjunctivae normal    Cardiovascular:      Rate and Rhythm: Normal rate and regular rhythm  Heart sounds: No murmur heard  Pulmonary:      Effort: Pulmonary effort is normal  No respiratory distress  Breath sounds: Normal breath sounds  Abdominal:      Palpations: Abdomen is soft  Tenderness: There is no abdominal tenderness  Musculoskeletal:         General: No swelling  Right upper arm: Swelling and edema present  Left upper arm: Normal       Cervical back: Neck supple  Skin:     General: Skin is warm and dry  Capillary Refill: Capillary refill takes less than 2 seconds  Neurological:      Mental Status: He is alert     Psychiatric:         Mood and Affect: Mood normal           Additional Data:     Labs:  Results from last 7 days   Lab Units 03/29/23  0519 03/28/23  0606 03/27/23  0946   WBC Thousand/uL 14 11*   < > 18 78*   HEMOGLOBIN g/dL 11 2*   < > 14 2   HEMATOCRIT % 33 3*   < > 40 6   PLATELETS Thousands/uL 259   < > 226   BANDS PCT %  --   --  12*   NEUTROS PCT % 73  --   --    LYMPHS PCT % 11*  --   --    LYMPHO PCT   --    < > 8*   MONOS PCT % 8  --   --    MONO PCT   --    < > 1*   EOS PCT % 6   < > 5    < > = values in this interval not displayed  Results from last 7 days   Lab Units 03/29/23  0519   SODIUM mmol/L 138   POTASSIUM mmol/L 3 3*   CHLORIDE mmol/L 105   CO2 mmol/L 27   BUN mg/dL 7   CREATININE mg/dL 0 75   ANION GAP mmol/L 6   CALCIUM mg/dL 8 2*   GLUCOSE RANDOM mg/dL 97                 Results from last 7 days   Lab Units 03/25/23  0006   LACTIC ACID mmol/L 1 7       Lines/Drains:  Invasive Devices     Peripheral Intravenous Line  Duration           Peripheral IV 03/29/23 Dorsal (posterior); Left Hand <1 day                      Imaging: Reviewed radiology reports from this admission including: xray(s)    Recent Cultures (last 7 days):   Results from last 7 days   Lab Units 03/26/23  1027 03/25/23  0029 03/25/23  0006   BLOOD CULTURE   --   --  No Growth After 4 Days  No Growth After 4 Days     GRAM STAIN RESULT  No polys seen*  1+ Gram positive cocci in pairs* Rare Polys*  2+ Gram positive cocci in pairs*  --    WOUND CULTURE  Few Colonies of Beta Hemolytic Streptococcus Group A*  --   --    BODY FLUID CULTURE, STERILE   --  3+ Growth of Beta Hemolytic Streptococcus Group A*  --        Last 24 Hours Medication List:   Current Facility-Administered Medications   Medication Dose Route Frequency Provider Last Rate   • clindamycin  600 mg Intravenous Q8H Viky Bullard  mg (03/29/23 2140)   • loperamide  2 mg Oral Q4H PRN Kev Reardon MD     • morphine injection  2 mg Intravenous Once Arik Boone DO     • nicotine  1 patch Transdermal Daily ALVINA Chi     • ondansetron  4 mg Intravenous Q6H PRN Lesly Haney PA-C     • oxyCODONE-acetaminophen  1 tablet Oral Q4H PRN Geremias Bey MD     • penicillin G  4 Million Units Intravenous Q4H Malka Castleman, MD 4 Million Units (03/29/23 1307)   • potassium chloride  20 mEq Oral Once Arnaldo Gonzalez MD     • saccharomyces boulardii  250 mg Oral BID Geremias Bey MD          Today, Patient Was Seen By: Geremias Bey MD    **Please Note: This note may have been constructed using a voice recognition system  **

## 2023-03-29 NOTE — ASSESSMENT & PLAN NOTE
· POA, presented with sudden onset right elbow erythema, pain, swelling, rigors  · Met sepsis criteria on admission as documented below  · Status post aspiration of bursa in the ED  Synovial fluid: Cloudy, WBC 21,000, GPC in pairs  · Mildly elevated sed rate and CRP  · Initially placed on high-dose cefazolin 2 g q 8 hours  · 3/26: Patient with worsening swelling, erythema, leukocytosis and persistent fever this morning  · Orthopedic surgery consulted for I&D which was done at the bedside on 3/26/2023  · CT of the right upper extremity ordered to evaluate for deeper infection and currently pending  · Wound culture and body fluid culture growing hemolytic Streptococcus  · Leukocytosis is improving  Patient remains afebrile  · Patient was switched from vancomycin to Ancef 2 g every 8 hours yesterday     ID switched patient Antibiotic to IV penciling G and Clindamycin yesterday  Plan  Continue Antibiotic as outlined above  Monitor vital signs  Serial exam  Neurovascular check

## 2023-03-30 LAB
ANION GAP SERPL CALCULATED.3IONS-SCNC: 7 MMOL/L (ref 4–13)
BACTERIA BLD CULT: NORMAL
BACTERIA BLD CULT: NORMAL
BASOPHILS # BLD AUTO: 0.06 THOUSANDS/ÂΜL (ref 0–0.1)
BASOPHILS NFR BLD AUTO: 1 % (ref 0–1)
BUN SERPL-MCNC: 7 MG/DL (ref 5–25)
CALCIUM SERPL-MCNC: 8.6 MG/DL (ref 8.4–10.2)
CHLORIDE SERPL-SCNC: 106 MMOL/L (ref 96–108)
CO2 SERPL-SCNC: 25 MMOL/L (ref 21–32)
CREAT SERPL-MCNC: 0.64 MG/DL (ref 0.6–1.3)
EOSINOPHIL # BLD AUTO: 0.7 THOUSAND/ÂΜL (ref 0–0.61)
EOSINOPHIL NFR BLD AUTO: 6 % (ref 0–6)
ERYTHROCYTE [DISTWIDTH] IN BLOOD BY AUTOMATED COUNT: 13.3 % (ref 11.6–15.1)
GFR SERPL CREATININE-BSD FRML MDRD: 125 ML/MIN/1.73SQ M
GLUCOSE SERPL-MCNC: 89 MG/DL (ref 65–140)
HCT VFR BLD AUTO: 35.7 % (ref 36.5–49.3)
HGB BLD-MCNC: 11.9 G/DL (ref 12–17)
IMM GRANULOCYTES # BLD AUTO: 0.21 THOUSAND/UL (ref 0–0.2)
IMM GRANULOCYTES NFR BLD AUTO: 2 % (ref 0–2)
LYMPHOCYTES # BLD AUTO: 2.27 THOUSANDS/ÂΜL (ref 0.6–4.47)
LYMPHOCYTES NFR BLD AUTO: 20 % (ref 14–44)
MCH RBC QN AUTO: 29.5 PG (ref 26.8–34.3)
MCHC RBC AUTO-ENTMCNC: 33.3 G/DL (ref 31.4–37.4)
MCV RBC AUTO: 89 FL (ref 82–98)
MONOCYTES # BLD AUTO: 0.88 THOUSAND/ÂΜL (ref 0.17–1.22)
MONOCYTES NFR BLD AUTO: 8 % (ref 4–12)
N GONORRHOEA SPEC QL CULT: NORMAL
NEUTROPHILS # BLD AUTO: 7.13 THOUSANDS/ÂΜL (ref 1.85–7.62)
NEUTS SEG NFR BLD AUTO: 63 % (ref 43–75)
NRBC BLD AUTO-RTO: 0 /100 WBCS
PLATELET # BLD AUTO: 294 THOUSANDS/UL (ref 149–390)
PMV BLD AUTO: 9.2 FL (ref 8.9–12.7)
POTASSIUM SERPL-SCNC: 4.1 MMOL/L (ref 3.5–5.3)
RBC # BLD AUTO: 4.03 MILLION/UL (ref 3.88–5.62)
SODIUM SERPL-SCNC: 138 MMOL/L (ref 135–147)
WBC # BLD AUTO: 11.25 THOUSAND/UL (ref 4.31–10.16)

## 2023-03-30 RX ADMIN — SODIUM CHLORIDE 4 MILLION UNITS: 9 INJECTION, SOLUTION INTRAVENOUS at 17:02

## 2023-03-30 RX ADMIN — Medication 250 MG: at 08:00

## 2023-03-30 RX ADMIN — Medication 250 MG: at 17:06

## 2023-03-30 RX ADMIN — SODIUM CHLORIDE 4 MILLION UNITS: 9 INJECTION, SOLUTION INTRAVENOUS at 04:24

## 2023-03-30 RX ADMIN — OXYCODONE HYDROCHLORIDE AND ACETAMINOPHEN 1 TABLET: 5; 325 TABLET ORAL at 04:51

## 2023-03-30 RX ADMIN — CLINDAMYCIN PHOSPHATE 600 MG: 600 INJECTION, SOLUTION INTRAVENOUS at 13:20

## 2023-03-30 RX ADMIN — OXYCODONE HYDROCHLORIDE AND ACETAMINOPHEN 1 TABLET: 5; 325 TABLET ORAL at 00:54

## 2023-03-30 RX ADMIN — OXYCODONE HYDROCHLORIDE AND ACETAMINOPHEN 1 TABLET: 5; 325 TABLET ORAL at 19:22

## 2023-03-30 RX ADMIN — OXYCODONE HYDROCHLORIDE AND ACETAMINOPHEN 1 TABLET: 5; 325 TABLET ORAL at 14:21

## 2023-03-30 RX ADMIN — SODIUM CHLORIDE 4 MILLION UNITS: 9 INJECTION, SOLUTION INTRAVENOUS at 11:59

## 2023-03-30 RX ADMIN — SODIUM CHLORIDE 4 MILLION UNITS: 9 INJECTION, SOLUTION INTRAVENOUS at 07:51

## 2023-03-30 RX ADMIN — OXYCODONE HYDROCHLORIDE AND ACETAMINOPHEN 1 TABLET: 5; 325 TABLET ORAL at 09:28

## 2023-03-30 RX ADMIN — SODIUM CHLORIDE 4 MILLION UNITS: 9 INJECTION, SOLUTION INTRAVENOUS at 20:35

## 2023-03-30 RX ADMIN — CLINDAMYCIN PHOSPHATE 600 MG: 600 INJECTION, SOLUTION INTRAVENOUS at 05:29

## 2023-03-30 RX ADMIN — OXYCODONE HYDROCHLORIDE AND ACETAMINOPHEN 1 TABLET: 5; 325 TABLET ORAL at 23:31

## 2023-03-30 RX ADMIN — SODIUM CHLORIDE 4 MILLION UNITS: 9 INJECTION, SOLUTION INTRAVENOUS at 00:51

## 2023-03-30 NOTE — PLAN OF CARE
Problem: PAIN - ADULT  Goal: Verbalizes/displays adequate comfort level or baseline comfort level  Description: Interventions:  - Encourage patient to monitor pain and request assistance  - Assess pain using appropriate pain scale  - Administer analgesics based on type and severity of pain and evaluate response  - Implement non-pharmacological measures as appropriate and evaluate response  - Consider cultural and social influences on pain and pain management  - Notify physician/advanced practitioner if interventions unsuccessful or patient reports new pain  Outcome: Progressing     Problem: INFECTION - ADULT  Goal: Absence or prevention of progression during hospitalization  Description: INTERVENTIONS:  - Assess and monitor for signs and symptoms of infection  - Monitor lab/diagnostic results  - Monitor all insertion sites, i e  indwelling lines, tubes, and drains  - Monitor endotracheal if appropriate and nasal secretions for changes in amount and color  - [place] appropriate cooling/warming therapies per order  - Administer medications as ordered  - Instruct and encourage patient and family to use good hand hygiene technique  - Identify and instruct in appropriate isolation precautions for identified infection/condition  Outcome: Progressing     Problem: SAFETY ADULT  Goal: Patient will remain free of falls  Description: INTERVENTIONS:  - Educate patient/family on patient safety including physical limitations  - Instruct patient to call for assistance with activity   - Consult OT/PT to assist with strengthening/mobility   - Keep Call bell within reach  - Keep bed low and locked with side rails adjusted as appropriate  - Keep care items and personal belongings within reach  - Initiate and maintain comfort rounds  - Make Fall Risk Sign visible to staff  - Apply yellow socks and bracelet for high fall risk patients  - Consider moving patient to room near nurses station  Outcome: Progressing  Goal: Maintain or return to baseline ADL function  Description: INTERVENTIONS:  -  Assess patient's ability to carry out ADLs; assess patient's baseline for ADL function and identify physical deficits which impact ability to perform ADLs (bathing, care of mouth/teeth, toileting, grooming, dressing, etc )  - Assess/evaluate cause of self-care deficits   - Assess range of motion  - Assess patient's mobility; develop plan if impaired  - Assess patient's need for assistive devices and provide as appropriate  - Encourage maximum independence but intervene and supervise when necessary  - Involve family in performance of ADLs  - Assess for home care needs following discharge   - Consider OT consult to assist with ADL evaluation and planning for discharge  - Provide patient education as appropriate  Outcome: Progressing  Goal: Maintains/Returns to pre admission functional level  Description: INTERVENTIONS:  - Perform BMAT or MOVE assessment daily    - Set and communicate daily mobility goal to care team and patient/family/caregiver     - Collaborate with rehabilitation services on mobility goals if consulted  - Out of bed for toileting  - Record patient progress and toleration of activity level   Outcome: Progressing     Problem: DISCHARGE PLANNING  Goal: Discharge to home or other facility with appropriate resources  Description: INTERVENTIONS:  - Identify barriers to discharge w/patient and caregiver  - Arrange for needed discharge resources and transportation as appropriate  - Identify discharge learning needs (meds, wound care, etc )  - Arrange for interpretive services to assist at discharge as needed  - Refer to Case Management Department for coordinating discharge planning if the patient needs post-hospital services based on physician/advanced practitioner order or complex needs related to functional status, cognitive ability, or social support system  Outcome: Progressing     Problem: Knowledge Deficit  Goal: Patient/family/caregiver demonstrates understanding of disease process, treatment plan, medications, and discharge instructions  Description: Complete learning assessment and assess knowledge base    Interventions:  - Provide teaching at level of understanding  - Provide teaching via preferred learning methods  Outcome: Progressing

## 2023-03-30 NOTE — PROGRESS NOTES
Progress Note - Orthopedics   Marcela Muro 41 y o  male MRN: [id number]  Unit/Bed#: S -01      Subjective:    41 y o male admitted with right elbow cellulitis and septic bursitis s/p bedside I&D 3/26  He reports continued improvement in his elbow pain  He reports that he overall is feeling better and better  He has been elevating the right upper extremity, and feels swelling has improved as well       Labs:  0   Lab Value Date/Time    HCT 35 7 (L) 03/30/2023 0113    HCT 33 3 (L) 03/29/2023 0519    HCT 37 5 03/28/2023 0606    HGB 11 9 (L) 03/30/2023 0113    HGB 11 2 (L) 03/29/2023 0519    HGB 12 7 03/28/2023 0606    WBC 11 25 (H) 03/30/2023 0113    WBC 14 11 (H) 03/29/2023 0519    WBC 14 00 (H) 03/28/2023 0606    ESR 24 (H) 03/25/2023 0006       Meds:    Current Facility-Administered Medications:   •  clindamycin (CLEOCIN) IVPB (premix in dextrose) 600 mg 50 mL, 600 mg, Intravenous, Q8H, Viky Bullard MD, Last Rate: 100 mL/hr at 03/30/23 0529, 600 mg at 03/30/23 0507  •  loperamide (IMODIUM) capsule 2 mg, 2 mg, Oral, Q4H PRN, Vivian Dye MD  •  morphine injection 2 mg, 2 mg, Intravenous, Once, Lauren Ricer, DO  •  nicotine (NICODERM CQ) 21 mg/24 hr TD 24 hr patch 1 patch, 1 patch, Transdermal, Daily, ALVINA Maldonado  •  ondansetron [facility]) injection 4 mg, 4 mg, Intravenous, Q6H PRN, Jonah Anguiano PA-C, 4 mg at 03/27/23 1055  •  oxyCODONE-acetaminophen (PERCOCET) 5-325 mg per tablet 1 tablet, 1 tablet, Oral, Q4H PRN, Vivian Dye MD, 1 tablet at 03/30/23 5941  •  penicillin G (PFIZERPEN-G) 4 Million Units in sodium chloride 0 9 % 50 mL IVPB, 4 Million Units, Intravenous, Q4H, Mitch Watson MD, Last Rate: 100 mL/hr at 03/30/23 0751, 4 Million Units at 03/30/23 0751  •  saccharomyces boulardii (FLORASTOR) capsule 250 mg, 250 mg, Oral, BID, Vivian Dye MD, 250 mg at 03/30/23 0800    Blood Culture:   Lab Results   Component Value Date    BLOODCX No Growth After 5 Days  03/25/2023    BLOODCX No Growth After 5 Days  03/25/2023       Wound Culture:   Lab Results   Component Value Date    WOUNDCULT (A) 03/26/2023     Few Colonies of Beta Hemolytic Streptococcus Group A       Ins and Outs:  I/O last 24 hours: In: 120 [P O :120]  Out: -           Physical:  Vitals:    03/30/23 0802   BP: 121/63   Pulse: 77   Resp: 16   Temp: (!) 97 4 °F (36 3 °C)   SpO2: 95%     Musculoskeletal: right Upper Extremity  · Dressings taken down  There is diffuse erythema over the arm that has spread out of outlined margins, appears improved today  Please see wound images in chart for full characterization  · He has warmth and swelling in the arm, down to and including the hand  Improved today  · Packing removed from elbow, with some purulence noted  Wound re-packed with 1/4 inch iodoform packing  Patient tolerated without issue  · PROM/AROM 0-150 degrees, with some pain, but no micromotion tenderness noted at the elbow  · He is able to supinate/pronate without issue  · Sensation intact to median/radial/ulnar nerve distribution   · Motor intact anterior interosseous nerve/posterior interosseous nerve/median/radial/ulnar nerve distributions  · 2+ radial pulse, symmetric bilaterally  · Digits warm and well perfused  · Capillary refill < 2 seconds    Assessment:    38 y o male with right elbow/arm cellulitis and right elbow septic bursitis, s/p bedside I&D 3/26/2023  Reports improvement in pain today, WBC trending down, fever curve improving  GC culture negative  AFB prelim negative  Body fluid/gram stain: 3+ beta hemolytic strep A  Plan:  · WBAT to the right upper extremity  · Elevate extremity  · S/p bedside I&D 3/26/2023, follow up final AFB culture, pending  · ABX per primary team/ID  · Dressing to remain in place, packing replaced today  Will re-evaluate wound tomorrow  · Analgesics per primary team    · Dispo: ortho will follow     · Medical management per primary team   · Discussed with Dr Baron Borja PASilvioC

## 2023-03-30 NOTE — PROGRESS NOTES
[facility]  Progress Note  Name: Vidya Ma  MRN: [id number]  Unit/Bed#: S -30 I Date of Admission: 3/24/2023   Date of Service: 3/30/2023 I Hospital Day: 5    Assessment/Plan   * Septic bursitis of elbow, right  Assessment & Plan  · POA, presented with sudden onset right elbow erythema, pain, swelling, rigors  · Met sepsis criteria on admission as documented below  · Status post aspiration of bursa in the ED  Synovial fluid: Cloudy, WBC 21,000, GPC in pairs  · Mildly elevated sed rate and CRP  · Initially placed on high-dose cefazolin 2 g q 8 hours  · 3/26: Patient with worsening swelling, erythema, leukocytosis and persistent fever this morning  · Orthopedic surgery consulted for I&D which was done at the bedside on 3/26/2023  · CT of the right upper extremity ordered to evaluate for deeper infection and currently pending  · Wound culture and body fluid culture growing hemolytic Streptococcus  · Leukocytosis is improving  Patient remains afebrile  · Patient was switched from vancomycin to Ancef 2 g every 8 hours yesterday  ID switched patient Antibiotic to IV penciling G and Clindamycin yesterday  Clindamycin has been discontinued due to patient's clinical improvement  Plan  Continue IV penicillin G  Patient will like transition to PO antibiotic tomorrow   Monitor vital signs  Serial exam  Neurovascular check    Sepsis ([facility] [address] )  Assessment & Plan  · POA, evidenced by leukocytosis, tachycardia and evidence of right elbow bursitis  · Leukocytosis continues to improve  · Blood cultures are negative x 5 days   · Wound culture growing Group a Strep hemolytic   · Orthopedic following  See plan above for bursitis    Nicotine dependence  Assessment & Plan  · Continue NicoDerm patch  · Smoking cessation counseling           VTE Pharmacologic Prophylaxis: VTE Score: 2 Low Risk (Score 0-2) - Encourage Ambulation      Patient Centered Rounds: I performed bedside rounds with nursing staff today  Discussions with Specialists or Other Care Team Provider: ID     Education and Discussions with Family / Patient: Patient declined call to   Current Length of Stay: 5 day(s)  Current Patient Status: Inpatient   Discharge Plan: Anticipate discharge in 24-48 hrs to home  Code Status: Level 1 - Full Code    Subjective:   Patient states that the pain and swelling continues improve  He denies any ongoing n/v/diarrhea  He states that he is tolerating PO  No new complaints today  Objective:     Vitals:   Temp (24hrs), Av °F (36 7 °C), Min:97 4 °F (36 3 °C), Max:98 8 °F (37 1 °C)    Temp:  [97 4 °F (36 3 °C)-98 8 °F (37 1 °C)] 97 4 °F (36 3 °C)  HR:  [77-79] 77  Resp:  [16-18] 16  BP: (110-121)/(62-68) 121/63  SpO2:  [95 %-98 %] 95 %  Body mass index is 24 78 kg/m²  Input and Output Summary (last 24 hours): Intake/Output Summary (Last 24 hours) at 3/30/2023 1403  Last data filed at 3/30/2023 0757  Gross per 24 hour   Intake 120 ml   Output --   Net 120 ml       Physical Exam:   Physical Exam  Vitals and nursing note reviewed  Constitutional:       General: He is not in acute distress  Appearance: He is well-developed  HENT:      Head: Normocephalic and atraumatic  Eyes:      Conjunctiva/sclera: Conjunctivae normal    Cardiovascular:      Rate and Rhythm: Normal rate and regular rhythm  Heart sounds: No murmur heard  Pulmonary:      Effort: Pulmonary effort is normal  No respiratory distress  Breath sounds: Normal breath sounds  Abdominal:      Palpations: Abdomen is soft  Tenderness: There is no abdominal tenderness  Musculoskeletal:         General: No swelling  Right upper arm: Swelling and edema present  Left upper arm: Normal       Cervical back: Neck supple  Skin:     General: Skin is warm and dry  Capillary Refill: Capillary refill takes less than 2 seconds  Neurological:      General: No focal deficit present  Mental Status: He is alert and oriented to person, place, and time  Psychiatric:         Mood and Affect: Mood normal           Additional Data:     Labs:  Results from last 7 days   Lab Units 03/30/23  0113 03/28/23  0606 03/27/23  0946   WBC Thousand/uL 11 25*   < > 18 78*   HEMOGLOBIN g/dL 11 9*   < > 14 2   HEMATOCRIT % 35 7*   < > 40 6   PLATELETS Thousands/uL 294   < > 226   BANDS PCT %  --   --  12*   NEUTROS PCT % 63   < >  --    LYMPHS PCT % 20   < >  --    LYMPHO PCT   --    < > 8*   MONOS PCT % 8   < >  --    MONO PCT   --    < > 1*   EOS PCT % 6   < > 5    < > = values in this interval not displayed  Results from last 7 days   Lab Units 03/30/23  0113   SODIUM mmol/L 138   POTASSIUM mmol/L 4 1   CHLORIDE mmol/L 106   CO2 mmol/L 25   BUN mg/dL 7   CREATININE mg/dL 0 64   ANION GAP mmol/L 7   CALCIUM mg/dL 8 6   GLUCOSE RANDOM mg/dL 89                 Results from last 7 days   Lab Units 03/25/23  0006   LACTIC ACID mmol/L 1 7       Lines/Drains:  Invasive Devices     Peripheral Intravenous Line  Duration           Peripheral IV 03/30/23 Distal;Left;Ventral (anterior) Forearm <1 day                      Imaging: No pertinent imaging reviewed  Recent Cultures (last 7 days):   Results from last 7 days   Lab Units 03/26/23  1027 03/25/23  0029 03/25/23  0006   BLOOD CULTURE   --   --  No Growth After 5 Days  No Growth After 5 Days     GRAM STAIN RESULT  No polys seen*  1+ Gram positive cocci in pairs* Rare Polys*  2+ Gram positive cocci in pairs*  --    WOUND CULTURE  Few Colonies of Beta Hemolytic Streptococcus Group A*  --   --    BODY FLUID CULTURE, STERILE   --  3+ Growth of Beta Hemolytic Streptococcus Group A*  --        Last 24 Hours Medication List:   Current Facility-Administered Medications   Medication Dose Route Frequency Provider Last Rate   • loperamide  2 mg Oral Q4H PRN Linette Chan MD     • morphine injection  2 mg Intravenous Once aGro Nguyen DO     • nicotine  1 patch Transdermal Daily ALVINA Rosas     • ondansetron  4 mg Intravenous Q6H PRN Manuel Smith PA-C     • oxyCODONE-acetaminophen  1 tablet Oral Q4H PRN Genna Mason MD     • penicillin G  4 Million Units Intravenous Q4H Shannon Jensen MD 4 Million Units (03/30/23 5459)   • saccharomyces boulardii  250 mg Oral BID Genna Mason MD          Today, Patient Was Seen By: Genna Mason MD    **Please Note: This note may have been constructed using a voice recognition system  **

## 2023-03-30 NOTE — ASSESSMENT & PLAN NOTE
· POA, evidenced by leukocytosis, tachycardia and evidence of right elbow bursitis  · Leukocytosis continues to improve     · Blood cultures are negative x 5 days   · Wound culture growing Group a Strep hemolytic   · Orthopedic following  See plan above for bursitis

## 2023-03-30 NOTE — PROGRESS NOTES
Progress Note - Infectious Disease   Ocpriscilla Majors 41 y o  male MRN: [id number]  Unit/Bed#: S -01 Encounter: [id number]      Impression/Plan:    1  Sepsis, present on admission with fever and leukocytosis  Due to #2 below  Concern for toxin mediated process in the setting of group A strep infection  Blood cultures show no growth  Fever curve and leukocytosis improving  Exam improving              -Antibiotics as below              -Monitor white blood cell count, fever curve                 -Appreciate orthopedic surgery follow-up      2  Right arm cellulitis, olecranon bursitis  Developing after a possible skin abrasion, scab developed on the right elbow  Status post aspiration in the ED and bedside I&D by orthopedic surgery 3/26/2023  Wound cultures showed growth of group A strep  Blood cultures show no growth  CT of the right arm did not show any drainable abscess, osteomyelitis, evidence of septic arthritis  Concern for toxin production in the setting of group A strep infection, antibiotics switched to IV penicillin G and clindamycin  Erythema and swelling of the right arm improving today  Fever curve and leukocytosis improving              -Continue IV penicillin G 4,000,000 units every 4 hours              -stop clindamycin              -Orthopedic surgery follow-up ongoing              -Continue local wound care              -Monitor clinical exam   -if continued improvement tomorrow, anticipate transition to PO amoxicillin to complete a 14 day total antibiotic course from I&D     3  Continue dependence  Encourage smoking cessation, management per primary     I have discussed the above management plan in detail with the primary service and patient at bedside  ID will follow  Antibiotics:  Penicillin/clindamycin day 3  Antibiotics day 6    Subjective: The patient is feeling better today  Reports improvement in right arm erythema and swelling   This is less drainage from the elbow on change of packing  No fever or chills  Objective:  Vitals:  Temp:  [97 4 °F (36 3 °C)-98 8 °F (37 1 °C)] 98 2 °F (36 8 °C)  HR:  [71-79] 71  Resp:  [16-18] 17  BP: (102-121)/(62-64) 102/64  SpO2:  [95 %] 95 %  Temp (24hrs), Av 1 °F (36 7 °C), Min:97 4 °F (36 3 °C), Max:98 8 °F (37 1 °C)  Current: Temperature: 98 2 °F (36 8 °C)    Physical Exam:   General Appearance:  Alert, interactive, nontoxic, no acute distress  Throat: Oropharynx moist without lesions  Lungs:   Clear to auscultation bilaterally; no wheezes, rhonchi or rales; respirations unlabored   Heart:  RRR; no murmur, rub or gallop   Abdomen:   Soft, non-tender, non-distended, positive bowel sounds  Extremities:  Right elbow, forearm, hand swelling   Skin:  Right olecranon bursa swelling, incision with packing in place  Diffuse erythema and swelling extending from the upper arm to the hand, improving from yesterday       Labs: All pertinent labs and imaging studies were personally reviewed  Results from last 7 days   Lab Units 23  0113 23  0519 23  0606   WBC Thousand/uL 11 25* 14 11* 14 00*   HEMOGLOBIN g/dL 11 9* 11 2* 12 7   PLATELETS Thousands/uL 294 259 231     Results from last 7 days   Lab Units 23  0113 23  0519 23  0606   SODIUM mmol/L 138 138 139   POTASSIUM mmol/L 4 1 3 3* 3 3*   CHLORIDE mmol/L 106 105 105   CO2 mmol/L 25 27 28   BUN mg/dL 7 7 7   CREATININE mg/dL 0 64 0 75 0 86   EGFR ml/min/1 73sq m 125 118 111   CALCIUM mg/dL 8 6 8 2* 8 5                       Micro:  Results from last 7 days   Lab Units 23  1027 23  0029 23  0006   BLOOD CULTURE   --   --  No Growth After 5 Days  No Growth After 5 Days     GRAM STAIN RESULT  No polys seen*  1+ Gram positive cocci in pairs* Rare Polys*  2+ Gram positive cocci in pairs*  --    WOUND CULTURE  Few Colonies of Beta Hemolytic Streptococcus Group A*  --   --    BODY FLUID CULTURE, STERILE   --  3+ Growth of Beta Hemolytic Streptococcus Group A*  --        Imaging:  I have personally reviewed pertinent imaging study reports and images in PACS      CT right upper extremity with contrast 3/27/2023: Cellulitis overlying the olecranon process, no residual abscess collection, no underlying osseous destruction, no joint effusion

## 2023-03-31 ENCOUNTER — TELEPHONE (OUTPATIENT)
Dept: OTHER | Facility: OTHER | Age: 38
End: 2023-03-31

## 2023-03-31 ENCOUNTER — TRANSITIONAL CARE MANAGEMENT (OUTPATIENT)
Dept: FAMILY MEDICINE CLINIC | Facility: CLINIC | Age: 38
End: 2023-03-31

## 2023-03-31 VITALS
RESPIRATION RATE: 16 BRPM | WEIGHT: 182.7 LBS | HEIGHT: 72 IN | SYSTOLIC BLOOD PRESSURE: 138 MMHG | HEART RATE: 63 BPM | TEMPERATURE: 97.9 F | BODY MASS INDEX: 24.75 KG/M2 | DIASTOLIC BLOOD PRESSURE: 74 MMHG | OXYGEN SATURATION: 96 %

## 2023-03-31 LAB
ANION GAP SERPL CALCULATED.3IONS-SCNC: 5 MMOL/L (ref 4–13)
BASOPHILS # BLD MANUAL: 0.09 THOUSAND/UL (ref 0–0.1)
BASOPHILS NFR MAR MANUAL: 1 % (ref 0–1)
BUN SERPL-MCNC: 8 MG/DL (ref 5–25)
CALCIUM SERPL-MCNC: 9 MG/DL (ref 8.4–10.2)
CHLORIDE SERPL-SCNC: 105 MMOL/L (ref 96–108)
CO2 SERPL-SCNC: 28 MMOL/L (ref 21–32)
CREAT SERPL-MCNC: 0.73 MG/DL (ref 0.6–1.3)
EOSINOPHIL # BLD MANUAL: 0.53 THOUSAND/UL (ref 0–0.4)
EOSINOPHIL NFR BLD MANUAL: 6 % (ref 0–6)
ERYTHROCYTE [DISTWIDTH] IN BLOOD BY AUTOMATED COUNT: 13.4 % (ref 11.6–15.1)
GFR SERPL CREATININE-BSD FRML MDRD: 119 ML/MIN/1.73SQ M
GLUCOSE SERPL-MCNC: 85 MG/DL (ref 65–140)
HCT VFR BLD AUTO: 37.9 % (ref 36.5–49.3)
HGB BLD-MCNC: 12.6 G/DL (ref 12–17)
LYMPHOCYTES # BLD AUTO: 2.02 THOUSAND/UL (ref 0.6–4.47)
LYMPHOCYTES # BLD AUTO: 23 % (ref 14–44)
MCH RBC QN AUTO: 29.9 PG (ref 26.8–34.3)
MCHC RBC AUTO-ENTMCNC: 33.2 G/DL (ref 31.4–37.4)
MCV RBC AUTO: 90 FL (ref 82–98)
METAMYELOCYTES NFR BLD MANUAL: 1 % (ref 0–1)
MONOCYTES # BLD AUTO: 0.79 THOUSAND/UL (ref 0–1.22)
MONOCYTES NFR BLD: 9 % (ref 4–12)
NEUTROPHILS # BLD MANUAL: 5.1 THOUSAND/UL (ref 1.85–7.62)
NEUTS BAND NFR BLD MANUAL: 3 % (ref 0–8)
NEUTS SEG NFR BLD AUTO: 55 % (ref 43–75)
PLATELET # BLD AUTO: 350 THOUSANDS/UL (ref 149–390)
PLATELET BLD QL SMEAR: ADEQUATE
PMV BLD AUTO: 9.1 FL (ref 8.9–12.7)
POTASSIUM SERPL-SCNC: 4.4 MMOL/L (ref 3.5–5.3)
RBC # BLD AUTO: 4.22 MILLION/UL (ref 3.88–5.62)
RBC MORPH BLD: NORMAL
SODIUM SERPL-SCNC: 138 MMOL/L (ref 135–147)
VARIANT LYMPHS # BLD AUTO: 2 %
WBC # BLD AUTO: 8.8 THOUSAND/UL (ref 4.31–10.16)

## 2023-03-31 RX ORDER — AMOXICILLIN 500 MG/1
1000 CAPSULE ORAL EVERY 8 HOURS SCHEDULED
Qty: 48 CAPSULE | Refills: 0 | Status: SHIPPED | OUTPATIENT
Start: 2023-03-31 | End: 2023-03-31 | Stop reason: SDUPTHER

## 2023-03-31 RX ORDER — AMOXICILLIN 500 MG/1
1000 CAPSULE ORAL EVERY 8 HOURS SCHEDULED
Qty: 50 CAPSULE | Refills: 0 | Status: SHIPPED | OUTPATIENT
Start: 2023-03-31 | End: 2023-04-08

## 2023-03-31 RX ORDER — AMOXICILLIN 250 MG/1
1000 CAPSULE ORAL EVERY 8 HOURS SCHEDULED
Status: DISCONTINUED | OUTPATIENT
Start: 2023-03-31 | End: 2023-03-31 | Stop reason: HOSPADM

## 2023-03-31 RX ADMIN — Medication 250 MG: at 08:45

## 2023-03-31 RX ADMIN — OXYCODONE HYDROCHLORIDE AND ACETAMINOPHEN 1 TABLET: 5; 325 TABLET ORAL at 08:45

## 2023-03-31 RX ADMIN — SODIUM CHLORIDE 4 MILLION UNITS: 9 INJECTION, SOLUTION INTRAVENOUS at 08:46

## 2023-03-31 RX ADMIN — SODIUM CHLORIDE 4 MILLION UNITS: 9 INJECTION, SOLUTION INTRAVENOUS at 00:41

## 2023-03-31 RX ADMIN — SODIUM CHLORIDE 4 MILLION UNITS: 9 INJECTION, SOLUTION INTRAVENOUS at 04:57

## 2023-03-31 RX ADMIN — AMOXICILLIN 1000 MG: 250 CAPSULE ORAL at 14:05

## 2023-03-31 NOTE — ASSESSMENT & PLAN NOTE
· POA, presented with sudden onset right elbow erythema, pain, swelling, rigors  · Met sepsis criteria on admission as documented below  · Status post aspiration of bursa in the ED  Synovial fluid: Cloudy, WBC 21,000, GPC in pairs  · Mildly elevated sed rate and CRP  · Initially placed on high-dose cefazolin 2 g q 8 hours  · 3/26: Patient with worsening swelling, erythema, leukocytosis and persistent fever this morning  · Orthopedic surgery consulted for I&D which was done at the bedside on 3/26/2023  · CT of the right upper extremity ordered to evaluate for deeper infection and currently pending  · Wound culture and body fluid culture growing hemolytic Streptococcus  · Leukocytosis is improving  Patient remains afebrile  · Patient was switched from vancomycin to Ancef 2 g every 8 hours yesterday  ID switched patient Antibiotic to IV penciling G and Clindamycin yesterday  Clindamycin has been discontinued due to patient's clinical improvement  Plan  Patient is stable for discharge    Patient will be discharged with Amoxicillin 1g TID through 4/8 per ID recommendation  Patient was instructed to keep his left arm free from water contact  Patient will be following up with his PCP within one week

## 2023-03-31 NOTE — DISCHARGE INSTR - AVS FIRST PAGE
Dear Morales Murry,     It was our pleasure to care for you here at [facility], [address]  It is our hope that we were always able to exceed the expected standards for your care during your stay  You were hospitalized due to Septic Bursitis  You were cared for on the fourth floor by Cherilynn Councilman, MD under the service of Kailey Kaur MD with the [facility] who covers for your primary care physician (PCP), Ozzy Rowe DO, while you were hospitalized  If you have any questions or concerns related to this hospitalization, you may contact us at [phone]  For follow up as well as any medication refills, we recommend that you follow up with your primary care physician  A registered nurse will reach out to you by phone within a few days after your discharge to answer any additional questions that you may have after going home  However, at this time we provide for you here, the most important instructions / recommendations at discharge:     Notable Medication Adjustments -   Amoxilin 500 mg has been added  Please take 2 capsule by mouth every 8 hours  Testing Required after Discharge -   none  Important follow up information -   Please contact your primary care physician for follow-up within 1 week       Other Instructions -   Please ensure to keep your right arm free of water  Please ensure that it is kept clean as it continues to heal   Please review this entire after visit summary as additional general instructions including medication list, appointments, activity, diet, any pertinent wound care, and other additional recommendations from your care team that may be provided for you        Sincerely,     Cherilynn Councilman, MD

## 2023-03-31 NOTE — PROGRESS NOTES
Progress Note - Infectious Disease   Kerry Harp 41 y o  male MRN: [id number]  Unit/Bed#: S -01 Encounter: [id number]      Impression/Plan:    1  Sepsis, present on admission with fever and leukocytosis  Due to #2 below  Concern for toxin mediated process in the setting of group A strep infection  Blood cultures show no growth  Fever curve and leukocytosis improving  Exam improving              -Antibiotics as below              -Monitor white blood cell count, fever curve                 -Appreciate orthopedic surgery follow-up      2  Right arm cellulitis, olecranon bursitis  Developing after a possible skin abrasion, scab developed on the right elbow  Status post aspiration in the ED and bedside I&D by orthopedic surgery 3/26/2023  Wound cultures showed growth of group A strep  Blood cultures show no growth  CT of the right arm did not show any drainable abscess, osteomyelitis, evidence of septic arthritis  Concern for toxin production in the setting of group A strep infection, antibiotics switched to IV penicillin G and clindamycin  Erythema and swelling of the right arm improving  Fevers resolved and WBC count normalized              -stop IV PCN   -start PO amoxicillin 1g every 8 hours   -complete a 14 day total antibiotic course from I&D, through 4/8/23              -Orthopedic surgery follow-up ongoing              -Continue local wound care   -encouraged elevation and compression wrapping to help with swelling    3  Continue dependence  Encourage smoking cessation, management per primary     I have discussed the above management plan in detail with the primary service and patient at bedside  ID will follow  Antibiotics:  Penicillin day 4  Antibiotics day 7    Subjective: The patient is feeling better today  Right arm erythema significantly improved, swelling now mainly localized to the forearm and hand  Purulent drainage resolved and packing removed   No fever or chills  Objective:  Vitals:  Temp:  [97 9 °F (36 6 °C)-98 5 °F (36 9 °C)] 97 9 °F (36 6 °C)  HR:  [63-73] 63  Resp:  [16-17] 16  BP: (102-138)/(64-74) 138/74  SpO2:  [95 %-98 %] 98 %  Temp (24hrs), Av 2 °F (36 8 °C), Min:97 9 °F (36 6 °C), Max:98 5 °F (36 9 °C)  Current: Temperature: 97 9 °F (36 6 °C)    Physical Exam:   General Appearance:  Alert, interactive, nontoxic, no acute distress  Throat: Oropharynx moist without lesions  Lungs:   Clear to auscultation bilaterally; no wheezes, rhonchi or rales; respirations unlabored   Heart:  RRR; no murmur, rub or gallop   Abdomen:   Soft, non-tender, non-distended, positive bowel sounds  Extremities:  Right elbow, forearm, hand swelling   Skin:  Right olecranon bursa swelling, incision without purulent drainage  Erythema and swelling improved, now mainly over the elbow and forearm       Labs: All pertinent labs and imaging studies were personally reviewed  Results from last 7 days   Lab Units 23  0430 23  0113 23  0519   WBC Thousand/uL 8 80 11 25* 14 11*   HEMOGLOBIN g/dL 12 6 11 9* 11 2*   PLATELETS Thousands/uL 350 294 259     Results from last 7 days   Lab Units 23  0430 23  0113 23  0519   SODIUM mmol/L 138 138 138   POTASSIUM mmol/L 4 4 4 1 3 3*   CHLORIDE mmol/L 105 106 105   CO2 mmol/L 28 25 27   BUN mg/dL 8 7 7   CREATININE mg/dL 0 73 0 64 0 75   EGFR ml/min/1 73sq m 119 125 118   CALCIUM mg/dL 9 0 8 6 8 2*                       Micro:  Results from last 7 days   Lab Units 23  1027 23  0029 23  0006   BLOOD CULTURE   --   --  No Growth After 5 Days  No Growth After 5 Days     GRAM STAIN RESULT  No polys seen*  1+ Gram positive cocci in pairs* Rare Polys*  2+ Gram positive cocci in pairs*  --    WOUND CULTURE  Few Colonies of Beta Hemolytic Streptococcus Group A*  --   --    BODY FLUID CULTURE, STERILE   --  3+ Growth of Beta Hemolytic Streptococcus Group A*  --        Imaging:  I have personally reviewed pertinent imaging study reports and images in PACS      CT right upper extremity with contrast 3/27/2023: Cellulitis overlying the olecranon process, no residual abscess collection, no underlying osseous destruction, no joint effusion

## 2023-03-31 NOTE — PROGRESS NOTES
Progress Note - Orthopedics   Kerry Harp 41 y o  male MRN: [id number]  Unit/Bed#: S -01      Subjective:    41 y o male admitted with right elbow cellulitis and septic bursitis s/p bedside I&D 3/26  Today he reports he feels better  Overall no complaints  Expects to be discharged today  Labs:  0   Lab Value Date/Time    HCT 37 9 03/31/2023 0430    HCT 35 7 (L) 03/30/2023 0113    HCT 33 3 (L) 03/29/2023 0519    HGB 12 6 03/31/2023 0430    HGB 11 9 (L) 03/30/2023 0113    HGB 11 2 (L) 03/29/2023 0519    WBC 8 80 03/31/2023 0430    WBC 11 25 (H) 03/30/2023 0113    WBC 14 11 (H) 03/29/2023 0519    ESR 24 (H) 03/25/2023 0006       Meds:    Current Facility-Administered Medications:   •  amoxicillin (AMOXIL) capsule 1,000 mg, 1,000 mg, Oral, Q8H [facility] & alf, Viky Bullard MD  •  loperamide (IMODIUM) capsule 2 mg, 2 mg, Oral, Q4H PRN, Linette Chan MD  •  morphine injection 2 mg, 2 mg, Intravenous, Once, Garo Nguyen DO  •  nicotine (NICODERM CQ) 21 mg/24 hr TD 24 hr patch 1 patch, 1 patch, Transdermal, Daily, ALVINA Ruffin  •  ondansetron [facility]) injection 4 mg, 4 mg, Intravenous, Q6H PRN, Anil Smith PA-C, 4 mg at 03/27/23 1055  •  oxyCODONE-acetaminophen (PERCOCET) 5-325 mg per tablet 1 tablet, 1 tablet, Oral, Q4H PRN, Linette Chan MD, 1 tablet at 03/31/23 0845  •  saccharomyces boulardii (FLORASTOR) capsule 250 mg, 250 mg, Oral, BID, Linette Chan MD, 250 mg at 03/31/23 0845    Blood Culture:   Lab Results   Component Value Date    BLOODCX No Growth After 5 Days  03/25/2023    BLOODCX No Growth After 5 Days  03/25/2023       Wound Culture:   Lab Results   Component Value Date    WOUNDCULT (A) 03/26/2023     Few Colonies of Beta Hemolytic Streptococcus Group A       Ins and Outs:  I/O last 24 hours:   In: 120 [P O :120]  Out: -           Physical:  Vitals:    03/31/23 0900   BP:    Pulse:    Resp:    Temp:    SpO2: 96%     Musculoskeletal: right Upper Extremity  · Dressings taken down  Significant improvement of surrounding erythema and swelling  Packing removed from the elbow without incident  Scant purulence  · PROM/AROM 0-150 degrees, with some pain, but no micromotion tenderness noted at the elbow  · He is able to supinate/pronate without issue  · Sensation intact to median/radial/ulnar nerve distribution   · Motor intact anterior interosseous nerve/posterior interosseous nerve/median/radial/ulnar nerve distributions  · 2+ radial pulse, symmetric bilaterally  · Digits warm and well perfused  · Capillary refill < 2 seconds    Assessment:    38 y o male with right elbow/arm cellulitis and right elbow septic bursitis, s/p bedside I&D 3/26/2023  Plan:  · WBAT to the right upper extremity  · Elevate extremity  · S/p bedside I&D 3/26/2023  · ABX per primary team/ID  · Dressing changes daily  Wound to close by secondary intention  · Analgesics per primary team    · Dispo: ortho will follow     · Medical management per primary team   · Discussed with Dr Jermaine Bonilla PA-C

## 2023-03-31 NOTE — DISCHARGE SUMMARY
[facility]  Discharge- [facility] 1985, 41 y o  male MRN: [id number]  Unit/Bed#: S -32 Encounter: [id number]  Primary Care Provider: Day Prakash DO   Date and time admitted to hospital: 3/24/2023  9:19 PM    * Septic bursitis of elbow, right  Assessment & Plan  · POA, presented with sudden onset right elbow erythema, pain, swelling, rigors  · Met sepsis criteria on admission as documented below  · Status post aspiration of bursa in the ED  Synovial fluid: Cloudy, WBC 21,000, GPC in pairs  · Mildly elevated sed rate and CRP  · Initially placed on high-dose cefazolin 2 g q 8 hours  · 3/26: Patient with worsening swelling, erythema, leukocytosis and persistent fever this morning  · Orthopedic surgery consulted for I&D which was done at the bedside on 3/26/2023  · CT of the right upper extremity ordered to evaluate for deeper infection and currently pending  · Wound culture and body fluid culture growing hemolytic Streptococcus  · Leukocytosis is improving  Patient remains afebrile  · Patient was switched from vancomycin to Ancef 2 g every 8 hours yesterday  ID switched patient Antibiotic to IV penciling G and Clindamycin yesterday  Clindamycin has been discontinued due to patient's clinical improvement  Plan  Patient is stable for discharge  Patient will be discharged with Amoxicillin 1g TID through 4/8 per ID recommendation  Patient was instructed to keep his left arm free from water contact  Patient will be following up with his PCP within one week    Sepsis [facility])  Assessment & Plan  · POA, evidenced by leukocytosis, tachycardia and evidence of right elbow bursitis  · Leukocytosis continues to improve     · Blood cultures are negative x 5 days   · Wound culture growing Group a Strep hemolytic   · Orthopedic following  See plan above for bursitis    Nicotine dependence  Assessment & Plan  · Continue NicoDerm patch  · Smoking cessation counseling      Medical Problems Resolved Problems  Date Reviewed: 3/26/2023   None       Discharging Resident: Candis Norman MD  Discharging Attending: No att  providers found  PCP: Chris Crespo DO  Admission Date:   Admission Orders (From admission, onward)     Ordered        03/25/23 0232  INPATIENT ADMISSION  Once                      Discharge Date: 03/31/23    Consultations During Hospital Stay:  · Orthopedic surgery  · Infectious disease  Procedures Performed:   · I&D    Significant Findings / Test Results:   XR elbow 2 vw RIGHT    Result Date: 3/25/2023  Impression: No acute osseous abnormality  Workstation performed: [id number]     CT upper extremity w contrast right    Result Date: 3/27/2023  Impression: Subcutaneous edema and skin ulceration at the olecranon process consistent with cellulitis with evidence of recent intervention including soft tissue emphysema and packing material   No evidence of residual abscess collection  No underlying osseous destructive change to indicate osteomyelitis  No deep soft tissue involvement  There is no significant elbow joint effusion to raise suspicion for septic arthropathy at this time  Workstation performed: [id number]       · No Chest XR results available for this patient  ·     Incidental Findings:   · none  ·     Test Results Pending at Discharge (will require follow up): · None     Outpatient Tests Requested:  · None    Complications: None    Reason for Admission: Swelling and pain in his right elbow  Hospital Course:   Ana Maria Oliva is a 41 y o  male patient who originally presented to the hospital on 3/24/2023 due to sudden onset pain, swelling in his right arm  In the ED patient was met sepsis criteria in the setting of leukocytosis, tachycardia and fever  Patient's elbow was initially aspirated in the ED and patient was started on high-dose cefazolin and vancomycin   overnight patient developed worsening swelling, erythema and increased leukocytosis with persistent fever  CT of the upper extremity did not show any evidence of abscess formation  Only cellulitis was identified  Orthopedic surgery was consulted and patient underwent I&D  Patient had a wound culture that grew group A strep hemolytic  He was initially started on cefazolin 2g q8  While being on cefazolin cellulitis was extending thus an ID consult was placed per infectious disease recommendation cefazolin was discontinued and patient was started on penicillin G and clindamycin  Patient started to improve after being on penicillin and clindamycin  Leukocytosis resolved and patient has been afebrile for more than 48 hours  Patient is stable for discharge  Patient was discharged on amoxicillin 1 g 3 times daily until 4/8/20/2023  Patient has been instructed to follow-up with his PCP within 1 week  Patient was agreeable to the above plan  The patient, initially admitted to the hospital as inpatient, was discharged earlier than expected given the following: stable for discharge  Please see above list of diagnoses and related plan for additional information  Condition at Discharge: good    Discharge Day Visit / Exam:   Subjective: Patient states that he feels overall well  Vitals: Blood Pressure: 138/74 (03/31/23 0731)  Pulse: 63 (03/31/23 0731)  Temperature: 97 9 °F (36 6 °C) (03/31/23 0731)  Temp Source: Oral (03/30/23 2213)  Respirations: 16 (03/31/23 0731)  Height: 6' (182 9 cm) (03/25/23 0343)  Weight - Scale: 82 9 kg (182 lb 11 2 oz) (03/25/23 0343)  SpO2: 96 % (03/31/23 0900)  Exam:   Physical Exam  Vitals and nursing note reviewed  Constitutional:       General: He is not in acute distress  Appearance: He is well-developed  HENT:      Head: Normocephalic and atraumatic  Eyes:      Conjunctiva/sclera: Conjunctivae normal    Cardiovascular:      Rate and Rhythm: Normal rate and regular rhythm  Heart sounds: No murmur heard    Pulmonary:      Effort: Pulmonary effort is normal  No respiratory distress  Breath sounds: Normal breath sounds  Abdominal:      Palpations: Abdomen is soft  Tenderness: There is no abdominal tenderness  Musculoskeletal:         General: No swelling  Cervical back: Neck supple  Skin:     General: Skin is warm and dry  Capillary Refill: Capillary refill takes less than 2 seconds  Neurological:      Mental Status: He is alert  Psychiatric:         Mood and Affect: Mood normal           Discussion with Family: Patient declined call to   Discharge instructions/Information to patient and family:   See after visit summary for information provided to patient and family  Provisions for Follow-Up Care:  See after visit summary for information related to follow-up care and any pertinent home health orders  Disposition:   Home    Planned Readmission: no    Discharge Medications:  See after visit summary for reconciled discharge medications provided to patient and/or family        **Please Note: This note may have been constructed using a voice recognition system**

## 2023-03-31 NOTE — PLAN OF CARE
Problem: PAIN - ADULT  Goal: Verbalizes/displays adequate comfort level or baseline comfort level  Description: Interventions:  - Encourage patient to monitor pain and request assistance  - Assess pain using appropriate pain scale  - Administer analgesics based on type and severity of pain and evaluate response  - Implement non-pharmacological measures as appropriate and evaluate response  - Consider cultural and social influences on pain and pain management  - Notify physician/advanced practitioner if interventions unsuccessful or patient reports new pain  Outcome: Progressing     Problem: INFECTION - ADULT  Goal: Absence or prevention of progression during hospitalization  Description: INTERVENTIONS:  - Assess and monitor for signs and symptoms of infection  - Monitor lab/diagnostic results  - Monitor all insertion sites, i e  indwelling lines, tubes, and drains  - Monitor endotracheal if appropriate and nasal secretions for changes in amount and color  - [place] appropriate cooling/warming therapies per order  - Administer medications as ordered  - Instruct and encourage patient and family to use good hand hygiene technique  - Identify and instruct in appropriate isolation precautions for identified infection/condition  Outcome: Progressing     Problem: SAFETY ADULT  Goal: Patient will remain free of falls  Description: INTERVENTIONS:  - Educate patient/family on patient safety including physical limitations  - Instruct patient to call for assistance with activity   - Consult OT/PT to assist with strengthening/mobility   - Keep Call bell within reach  - Keep bed low and locked with side rails adjusted as appropriate  - Keep care items and personal belongings within reach  - Initiate and maintain comfort rounds  - Make Fall Risk Sign visible to staff  - Offer Toileting every 2 Hours, in advance of need  - Initiate/Maintain bed alarm  - Apply yellow socks and bracelet for high fall risk patients  - Consider moving patient to room near nurses station  Outcome: Progressing  Goal: Maintain or return to baseline ADL function  Description: INTERVENTIONS:  -  Assess patient's ability to carry out ADLs; assess patient's baseline for ADL function and identify physical deficits which impact ability to perform ADLs (bathing, care of mouth/teeth, toileting, grooming, dressing, etc )  - Assess/evaluate cause of self-care deficits   - Assess range of motion  - Assess patient's mobility; develop plan if impaired  - Assess patient's need for assistive devices and provide as appropriate  - Encourage maximum independence but intervene and supervise when necessary  - Involve family in performance of ADLs  - Assess for home care needs following discharge   - Consider OT consult to assist with ADL evaluation and planning for discharge  - Provide patient education as appropriate  Outcome: Progressing  Goal: Maintains/Returns to pre admission functional level  Description: INTERVENTIONS:  - Perform BMAT or MOVE assessment daily    - Set and communicate daily mobility goal to care team and patient/family/caregiver  - Collaborate with rehabilitation services on mobility goals if consulted  - Perform Range of Motion 3 times a day  - Reposition patient every 2 hours    - Dangle patient 3 times a day  - Stand patient 3 times a day  - Ambulate patient 3 times a day  - Out of bed to chair 3 times a day   - Out of bed for meals 3 times a day  - Out of bed for toileting  - Record patient progress and toleration of activity level   Outcome: Progressing     Problem: DISCHARGE PLANNING  Goal: Discharge to home or other facility with appropriate resources  Description: INTERVENTIONS:  - Identify barriers to discharge w/patient and caregiver  - Arrange for needed discharge resources and transportation as appropriate  - Identify discharge learning needs (meds, wound care, etc )  - Arrange for interpretive services to assist at discharge as needed  - Refer to Case Management Department for coordinating discharge planning if the patient needs post-hospital services based on physician/advanced practitioner order or complex needs related to functional status, cognitive ability, or social support system  Outcome: Progressing     Problem: Knowledge Deficit  Goal: Patient/family/caregiver demonstrates understanding of disease process, treatment plan, medications, and discharge instructions  Description: Complete learning assessment and assess knowledge base    Interventions:  - Provide teaching at level of understanding  - Provide teaching via preferred learning methods  Outcome: Progressing

## 2023-03-31 NOTE — TELEPHONE ENCOUNTER
Pt called needing to schedule a hospital follow up appt  With Dr Clark Olivares  Please call pt when office reopens to schedule

## 2023-04-04 LAB
MYCOBACTERIUM SPEC CULT: NORMAL
RHODAMINE-AURAMINE STN SPEC: NORMAL

## 2023-04-06 ENCOUNTER — OFFICE VISIT (OUTPATIENT)
Dept: FAMILY MEDICINE CLINIC | Facility: CLINIC | Age: 38
End: 2023-04-06

## 2023-04-06 ENCOUNTER — HOSPITAL ENCOUNTER (OUTPATIENT)
Dept: NON INVASIVE DIAGNOSTICS | Facility: HOSPITAL | Age: 38
Discharge: HOME | End: 2023-04-06

## 2023-04-06 VITALS
TEMPERATURE: 96.8 F | HEART RATE: 76 BPM | OXYGEN SATURATION: 98 % | SYSTOLIC BLOOD PRESSURE: 102 MMHG | DIASTOLIC BLOOD PRESSURE: 62 MMHG | WEIGHT: 184.2 LBS | RESPIRATION RATE: 16 BRPM | BODY MASS INDEX: 24.95 KG/M2 | HEIGHT: 72 IN

## 2023-04-06 DIAGNOSIS — T82.898D: ICD-10-CM

## 2023-04-06 DIAGNOSIS — Z76.89 ENCOUNTER FOR SUPPORT AND COORDINATION OF TRANSITION OF CARE: ICD-10-CM

## 2023-04-06 DIAGNOSIS — M79.89 LEFT ARM SWELLING: ICD-10-CM

## 2023-04-06 DIAGNOSIS — M71.121 SEPTIC BURSITIS OF ELBOW, RIGHT: Primary | ICD-10-CM

## 2023-04-06 NOTE — PROGRESS NOTES
TRANSITION OF CARE OFFICE VISIT  [facility] - [facility] [place]    NAME: Lethaniel Crigler  AGE: 41 y o  SEX: male  : 1985     DATE: 2023     Assessment and Plan:     Problem List Items Addressed This Visit        Musculoskeletal and Integument    Septic bursitis of elbow, right - Primary   Other Visit Diagnoses     Encounter for support and coordination of transition of care        Hematoma of intravenous catheter site, subsequent encounter        Relevant Orders    VAS upper limb venous duplex scan, unilateral/limited    Left arm swelling        Relevant Orders    VAS upper limb venous duplex scan, unilateral/limited      The case discussed with patient using patient centered shared decision making  The patient was counseled regarding instructions for management,-- risk factor reductions,-- prognosis,-- impressions,-- risks and benefits of treatment options,-- importance of compliance with treatment  I have reviewed the instructions with the patient, answering all questions to his satisfaction  No evidence of cellulitis, abscess, streaking present on exam  Bursitis slowly resolving  Finish antibiotics  Cont restorative care efforts  I will monitor closely  Recheck in 1 week or sooner for relapse cellulitis  Check VAS left arm r/o dvt      Transitional Care Management Review:     Lethaniel Crigler is a 41 y o  male here for TCM follow-up    During the TCM phone call patient stated:    TCM Call     Date and time call was made  3/31/2023  5:05 PM    Hospital care reviewed  Lab studies pending    Patient was hospitialized at  [address] [place] [address]    Date of Admission  23    Date of discharge  23    Diagnosis  Septic bursitis of elbow, right      TCM Call     Comments  Called pt -- phone line disconnected  Called pt mother and she would not give me pts phone number despite stating that this was in fact [address] [facility]   Will send letter via mail that pt must schedule f/u appt w Dr Rosario Pro  HPI:     Patient seen for follow up discharge  Was admitted to [facility] 3/24-3/31 for septic bursitis  Records reviewed  Cultures grew strep  He is finishing course of amox    He reports gradual improvement  Swelling slowly dissipating  Wound now scabbed and has not drained  Denies pain, fever, spreading redness    The following portions of the patient's history were reviewed and updated as appropriate: allergies, current medications, past family history, past medical history, past social history, past surgical history and problem list      Review of Systems:     Review of Systems     Problem List:     Patient Active Problem List   Diagnosis   • Nicotine dependence   • Septic bursitis of elbow, right   • Sepsis ([address] )        Objective:     /62 (BP Location: Left arm, Patient Position: Sitting, Cuff Size: Standard)   Pulse 76   Temp (!) 96 8 °F (36 °C) (Temporal)   Resp 16   Ht 6' (1 829 m)   Wt 83 6 kg (184 lb 3 2 oz)   SpO2 98%   BMI 24 98 kg/m²     Physical Exam  Vitals and nursing note reviewed  Constitutional:       General: He is not in acute distress  Appearance: Normal appearance  He is not ill-appearing  Cardiovascular:      Rate and Rhythm: Normal rate and regular rhythm  Pulses: Normal pulses  Pulmonary:      Effort: Pulmonary effort is normal       Breath sounds: Normal breath sounds  Musculoskeletal:      Right elbow: Swelling present  No tenderness  Arms:       Comments: See image   Skin:     General: Skin is warm and dry  Neurological:      General: No focal deficit present  Mental Status: He is alert  Psychiatric:         Mood and Affect: Mood normal                     Laboratory Results: I have personally reviewed the pertinent laboratory results/reports     Radiology/Other Diagnostic Testing Results: I have personally reviewed pertinent reports        XR elbow 2 vw RIGHT    Result Date: 3/25/2023  RIGHT ELBOW INDICATION:   pain, swelling erythema  COMPARISON:  None VIEWS:  XR ELBOW 2 VW RIGHT FINDINGS: There is no acute fracture or dislocation  There is no joint effusion  No significant degenerative changes  No lytic or blastic osseous lesion  Soft tissues are unremarkable  No acute osseous abnormality  Workstation performed: [id number]        Current Medications:     Outpatient Medications Prior to Visit   Medication Sig Dispense Refill   • amoxicillin (AMOXIL) 500 mg capsule Take 2 capsules (1,000 mg total) by mouth every 8 (eight) hours for 8 days 50 capsule 0     No facility-administered medications prior to visit         ALVINA Redman  [facility]

## 2023-04-06 NOTE — PROGRESS NOTES
Assessment & Plan     1  Septic bursitis of elbow, right    2  Encounter for support and coordination of transition of care         Subjective     Transitional Care Management Review:   Armida Lopez is a 41 y o  male here for TCM follow up  During the TCM phone call patient stated:  TCM Call     Date and time call was made  3/31/2023  5:05 PM    Hospital care reviewed  Lab studies pending    Patient was hospitialized at  [address] [place] [address]    Date of Admission  03/24/23    Date of discharge  03/31/23    Diagnosis  Septic bursitis of elbow, right      TCM Call     Comments  Called pt -- phone line disconnected  Called pt mother and she would not give me pts phone number despite stating that this was in fact [id number] [facility]  Will send letter via mail that pt must schedule f/u appt w Dr Luis Enrique Green  HPI  Review of Systems    Objective     /62 (BP Location: Left arm, Patient Position: Sitting, Cuff Size: Standard)   Pulse 76   Temp (!) 96 8 °F (36 °C) (Temporal)   Resp 16   Ht 6' (1 829 m)   Wt 83 6 kg (184 lb 3 2 oz)   SpO2 98%   BMI 24 98 kg/m²      Physical Exam  { Medications have NOT been reviewed by provider in current encounter   Once medications have been reviewed, please refresh your progress note so that you can sign the visit }    ALVINA López

## 2023-05-02 LAB
MYCOBACTERIUM SPEC CULT: NORMAL
RHODAMINE-AURAMINE STN SPEC: NORMAL

## 2023-05-15 LAB
MYCOBACTERIUM SPEC CULT: NORMAL
RHODAMINE-AURAMINE STN SPEC: NORMAL

## 2023-05-25 PROBLEM — A41.9 SEPSIS (HCC): Status: RESOLVED | Noted: 2023-03-25 | Resolved: 2023-05-25

## 2023-06-19 ENCOUNTER — OFFICE VISIT (OUTPATIENT)
Dept: URGENT CARE | Age: 38
End: 2023-06-19
Payer: COMMERCIAL

## 2023-06-19 VITALS — TEMPERATURE: 98.9 F | HEART RATE: 85 BPM | OXYGEN SATURATION: 99 % | RESPIRATION RATE: 18 BRPM
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
## 2023-06-19 DIAGNOSIS — S70.362A TICK BITE OF LEFT THIGH, INITIAL ENCOUNTER: Primary | ICD-10-CM

## 2023-06-19 PROCEDURE — S9088 SERVICES PROVIDED IN URGENT: HCPCS

## 2023-06-19 PROCEDURE — 99213 OFFICE O/P EST LOW 20 MIN: CPT

## 2023-06-19 NOTE — PROGRESS NOTES
"  [facility]        NAME: Clement Cruz is a 41 y o  male  : 1985    MRN: [id number]  DATE: 2023  TIME: 3:13 PM    Assessment and Plan   Tick bite of left thigh, initial encounter Jagruti Brilliant  XXXA]  1  Tick bite of left thigh, initial encounter          Universal Protocol:  Consent: Verbal consent obtained  Risks and benefits: risks, benefits and alternatives were discussed  Consent given by: patient  Time out: Immediately prior to procedure a \"time out\" was called to verify the correct patient, procedure, equipment, support staff and site/side marked as required  Patient understanding: patient states understanding of the procedure being performed  Patient identity confirmed: verbally with patient    Foreign body removal    Date/Time: 2023 1:30 PM    Performed by: ALVINA Millard  Authorized by: ALVINA Millard  Body area: skin  Removal mechanism: forceps and scalpel (scalple was  not used to make a direct incision, but used to remove tick head)  Complexity: simple  1 objects recovered  Objects recovered: tick head   Post-procedure assessment: foreign body removed  Patient tolerance: patient tolerated the procedure well with no immediate complications        Patient Instructions     Monitor site for signs of infection and seek care immediately if signs of infection occur  Follow up with PCP in 3-5 days  Proceed to  ER if symptoms worsen  Chief Complaint     Chief Complaint   Patient presents with   • Tick Bite         History of Present Illness       Patient presenting for evaluation of tick bite  Patient states that earlier today he noticed a [place] tick on his left thigh  He states that he attempted to remove the tick, but believes the tick head is still attached  He denies any signs of infection including fevers, chills, redness or drainage from the area  He is presenting today for removal of the tick head        Review of Systems   Review of Systems " Constitutional: Negative for chills and fever  Respiratory: Negative for shortness of breath  Cardiovascular: Negative for chest pain  Skin: Positive for wound (tick bite )  All other systems reviewed and are negative  Current Medications     No current outpatient medications on file  Current Allergies     Allergies as of 06/19/2023   • (No Known Allergies)            The following portions of the patient's history were reviewed and updated as appropriate: allergies, current medications, past family history, past medical history, past social history, past surgical history and problem list      History reviewed  No pertinent past medical history  History reviewed  No pertinent surgical history  History reviewed  No pertinent family history  Medications have been verified  Objective   Pulse 85   Temp 98 9 °F (37 2 °C)   Resp 18   SpO2 99%        Physical Exam     Physical Exam  Vitals and nursing note reviewed  Constitutional:       General: He is not in acute distress  Appearance: Normal appearance  He is normal weight  He is not ill-appearing, toxic-appearing or diaphoretic  HENT:      Head: Normocephalic and atraumatic  Eyes:      General:         Right eye: No discharge  Left eye: No discharge  Cardiovascular:      Rate and Rhythm: Normal rate  Pulses: Normal pulses  Pulmonary:      Effort: Pulmonary effort is normal    Skin:     General: Skin is warm and dry  Findings: No bruising or erythema  Neurological:      Mental Status: He is alert     Psychiatric:         Mood and Affect: Mood normal          Behavior: Behavior normal

## 2025-01-29 ENCOUNTER — TELEPHONE (OUTPATIENT)
Dept: FAMILY MEDICINE CLINIC | Facility: CLINIC | Age: 40
End: 2025-01-29

## 2025-01-29 NOTE — TELEPHONE ENCOUNTER
Called pt and left msg to call and let us know if he is still pt here and if he would like to schedule an appointment with either Dr Rowe or Emelia  or if he is has another PCP

## 2025-05-12 NOTE — ASSESSMENT & PLAN NOTE
· POA, presented with sudden onset right elbow erythema, pain, swelling, rigors  · Met sepsis criteria on admission as documented below  · Status post aspiration of bursa in the ED  Synovial fluid: Cloudy, WBC 21,000, GPC in pairs  · Mildly elevated sed rate and CRP  · Initially placed on high-dose cefazolin 2 g q 8 hours  · 3/26: Patient with worsening swelling, erythema, leukocytosis and persistent fever this morning  · Orthopedic surgery consulted for I&D which was done at the bedside on 3/26/2023  · CT of the right upper extremity ordered to evaluate for deeper infection and currently pending  · Wound culture and body fluid culture growing hemolytic Streptococcus  · Leukocytosis is improving  Patient remains afebrile  · Patient was switched from vancomycin to Ancef 2 g every 8 hours yesterday  ID switched patient Antibiotic to IV penciling G and Clindamycin yesterday  Clindamycin has been discontinued due to patient's clinical improvement       Plan  Continue IV penicillin G  Patient will like transition to PO antibiotic tomorrow   Monitor vital signs  Serial exam  Neurovascular check [Parent] : parent [Family Member] : family member [FreeTextEntry2] : POV #2 s/p right clavicle ORIF  DOS: 04/01/2025